# Patient Record
Sex: MALE | Race: ASIAN | Employment: FULL TIME | URBAN - METROPOLITAN AREA
[De-identification: names, ages, dates, MRNs, and addresses within clinical notes are randomized per-mention and may not be internally consistent; named-entity substitution may affect disease eponyms.]

---

## 2017-01-23 ENCOUNTER — APPOINTMENT (OUTPATIENT)
Dept: GENERAL RADIOLOGY | Age: 62
End: 2017-01-23
Attending: FAMILY MEDICINE
Payer: COMMERCIAL

## 2017-01-23 ENCOUNTER — HOSPITAL ENCOUNTER (OUTPATIENT)
Age: 62
Discharge: ANOTHER HOSPITAL | End: 2017-01-23
Attending: FAMILY MEDICINE
Payer: COMMERCIAL

## 2017-01-23 VITALS
SYSTOLIC BLOOD PRESSURE: 121 MMHG | RESPIRATION RATE: 24 BRPM | DIASTOLIC BLOOD PRESSURE: 79 MMHG | TEMPERATURE: 98 F | OXYGEN SATURATION: 94 % | HEART RATE: 81 BPM | WEIGHT: 175 LBS

## 2017-01-23 DIAGNOSIS — J18.9 COMMUNITY ACQUIRED PNEUMONIA: ICD-10-CM

## 2017-01-23 DIAGNOSIS — J90 PLEURAL EFFUSION, RIGHT: Primary | ICD-10-CM

## 2017-01-23 DIAGNOSIS — R09.02 HYPOXIA: ICD-10-CM

## 2017-01-23 PROCEDURE — 71101 X-RAY EXAM UNILAT RIBS/CHEST: CPT

## 2017-01-23 PROCEDURE — 99205 OFFICE O/P NEW HI 60 MIN: CPT

## 2017-01-23 RX ORDER — IBUPROFEN 800 MG/1
800 TABLET ORAL ONCE
Status: COMPLETED | OUTPATIENT
Start: 2017-01-23 | End: 2017-01-23

## 2017-01-23 NOTE — ED PROVIDER NOTES
Patient Seen in: [facility]    History   Patient presents with:  Pain (neurologic)    Stated Complaint: rib pain    HPI     Note: Patient is Spanish, and needs a [address] .     57-year-old male coming in with complaint 1232 95 %   O2 Device 01/23/17 1232 None (Room air)       Current:/79 mmHg  Pulse 81  Temp(Src) 98.3 °F (36.8 °C) (Oral)  Resp 24  Wt 79.379 kg  SpO2 94%    Physical Exam  GEN: Not in any acute distress, making good conversation, answering appropriat 1. No acute displaced right rib fracture. 2. Moderate size right pleural effusion with atelectasis/consolidation.     Dictated by: Andrew Rodriguez MD on 1/23/2017 at 13:57     Approved by: Andrew Rodriguez MD            No rib fractures noted on this x-ray and a moderat

## 2017-01-23 NOTE — ED INITIAL ASSESSMENT (HPI)
Patient states through  line that he was having chest pains at work. Patient states he heard a noise on the right side of his ribs and wasn't able to go to work on Friday. Patient states he was unable to tell his supervisor on Friday.   Patient

## 2020-09-15 ENCOUNTER — OFFICE VISIT (OUTPATIENT)
Dept: FAMILY MEDICINE CLINIC | Facility: CLINIC | Age: 65
End: 2020-09-15

## 2020-09-15 VITALS
WEIGHT: 200 LBS | RESPIRATION RATE: 17 BRPM | HEIGHT: 67.5 IN | HEART RATE: 86 BPM | BODY MASS INDEX: 31.02 KG/M2 | DIASTOLIC BLOOD PRESSURE: 118 MMHG | SYSTOLIC BLOOD PRESSURE: 172 MMHG | OXYGEN SATURATION: 99 %

## 2020-09-15 DIAGNOSIS — Z13.21 SCREENING FOR ENDOCRINE, NUTRITIONAL, METABOLIC AND IMMUNITY DISORDER: ICD-10-CM

## 2020-09-15 DIAGNOSIS — Z13.228 SCREENING FOR ENDOCRINE, NUTRITIONAL, METABOLIC AND IMMUNITY DISORDER: ICD-10-CM

## 2020-09-15 DIAGNOSIS — Z12.11 SCREENING FOR COLON CANCER: ICD-10-CM

## 2020-09-15 DIAGNOSIS — Z13.0 SCREENING FOR ENDOCRINE, NUTRITIONAL, METABOLIC AND IMMUNITY DISORDER: ICD-10-CM

## 2020-09-15 DIAGNOSIS — I15.2 HYPERTENSION ASSOCIATED WITH DIABETES (HCC): ICD-10-CM

## 2020-09-15 DIAGNOSIS — Z12.5 SCREENING FOR PROSTATE CANCER: ICD-10-CM

## 2020-09-15 DIAGNOSIS — E11.59 HYPERTENSION ASSOCIATED WITH DIABETES (HCC): ICD-10-CM

## 2020-09-15 DIAGNOSIS — E11.65 UNCONTROLLED TYPE 2 DIABETES MELLITUS WITH HYPERGLYCEMIA (HCC): Primary | ICD-10-CM

## 2020-09-15 DIAGNOSIS — Z13.29 SCREENING FOR ENDOCRINE, NUTRITIONAL, METABOLIC AND IMMUNITY DISORDER: ICD-10-CM

## 2020-09-15 LAB
CARTRIDGE LOT#: 638 NUMERIC
HEMOGLOBIN A1C: 11.5 % (ref 4.3–5.6)

## 2020-09-15 PROCEDURE — 3080F DIAST BP >= 90 MM HG: CPT | Performed by: EMERGENCY MEDICINE

## 2020-09-15 PROCEDURE — 3077F SYST BP >= 140 MM HG: CPT | Performed by: EMERGENCY MEDICINE

## 2020-09-15 PROCEDURE — 99204 OFFICE O/P NEW MOD 45 MIN: CPT | Performed by: EMERGENCY MEDICINE

## 2020-09-15 PROCEDURE — 3008F BODY MASS INDEX DOCD: CPT | Performed by: EMERGENCY MEDICINE

## 2020-09-15 PROCEDURE — 83036 HEMOGLOBIN GLYCOSYLATED A1C: CPT | Performed by: EMERGENCY MEDICINE

## 2020-09-15 RX ORDER — IRBESARTAN 150 MG/1
300 TABLET ORAL DAILY
COMMUNITY
Start: 2020-07-09 | End: 2020-09-15

## 2020-09-15 RX ORDER — AMLODIPINE BESYLATE 5 MG/1
5 TABLET ORAL DAILY
Qty: 30 TABLET | Refills: 2 | Status: SHIPPED | OUTPATIENT
Start: 2020-09-15 | End: 2021-02-07

## 2020-09-15 RX ORDER — IRBESARTAN 300 MG/1
300 TABLET ORAL NIGHTLY
Qty: 30 TABLET | Refills: 2 | Status: SHIPPED | OUTPATIENT
Start: 2020-09-15 | End: 2021-02-04

## 2020-09-15 NOTE — PROGRESS NOTES
Chief Complaint:   Patient presents with:  Physical: NP, annual physical     HPI:   This is a 72year old male     [address]  Dx with DM x 15 + years. Care mostly done in the [facility].  Last seen by MD was 1 1/2 years Weight as of this encounter: 200 lb (90.7 kg). Vital signs reviewed. Appears stated age, well groomed.   GENERAL: well developed, well nourished, well hydrated, no distress  SKIN: good skin turgor, no obvious rashes  HEENT: atraumatic, normocephalic, ears, PLATELET; Future  - COMP METABOLIC PANEL (14); Future  - LIPID PANEL; Future  - TSH W REFLEX TO FREE T4; Future    4. Screening for prostate cancer  - PSA SCREEN; Future    5. Screening for colon cancer  - OCCULT BLOOD, FECAL, IMMUNOASSAY;  Future        PA

## 2020-09-15 NOTE — PATIENT INSTRUCTIONS
Thank you for choosing [address] [place] [address] Group  To Do:  FOR INOCENTE BUSCH        1. Increase Metformin to 1000 mg twice a day  2. Continue with irbesartan 300 mg once a day (1 tab only)  3. Start amlodipine 5 mg once a day for blood pressure  4.  Start [employer] figure out what your ideal A1C or eAG should be. Your target number will depend on your age, general health, and other factors. If your current number is too high, your treatment plan may need changes, such as different medicines.   Sample results  Most peo

## 2020-12-23 ENCOUNTER — TELEPHONE (OUTPATIENT)
Dept: ENDOCRINOLOGY CLINIC | Facility: CLINIC | Age: 65
End: 2020-12-23

## 2020-12-23 NOTE — TELEPHONE ENCOUNTER
Spoke with patient regarding setting up an appointment with a diabetes provider for diabetes management at [facility] based on A1c from high A1c list. Patient states, \"I would like to be called next month on this after I see my doctor. \"

## 2021-01-01 ENCOUNTER — EXTERNAL RECORD (OUTPATIENT)
Dept: OTHER | Age: 66
End: 2021-01-01

## 2021-02-02 DIAGNOSIS — E11.59 HYPERTENSION ASSOCIATED WITH DIABETES (HCC): ICD-10-CM

## 2021-02-02 DIAGNOSIS — I15.2 HYPERTENSION ASSOCIATED WITH DIABETES (HCC): ICD-10-CM

## 2021-02-02 DIAGNOSIS — E11.65 UNCONTROLLED TYPE 2 DIABETES MELLITUS WITH HYPERGLYCEMIA (HCC): ICD-10-CM

## 2021-02-02 NOTE — TELEPHONE ENCOUNTER
Medication(s) to Refill:   Requested Prescriptions     Pending Prescriptions Disp Refills   • METFORMIN HCL 1000 MG Oral Tab [Pharmacy Med Name: metFORMIN HCl 1000 MG Oral Tablet] 60 tablet 0     Sig: TAKE 1 TABLET BY MOUTH TWICE DAILY WITH MEALS   • AMLOD

## 2021-02-04 ENCOUNTER — OFFICE VISIT (OUTPATIENT)
Dept: FAMILY MEDICINE CLINIC | Facility: CLINIC | Age: 66
End: 2021-02-04

## 2021-02-04 ENCOUNTER — APPOINTMENT (OUTPATIENT)
Dept: GENERAL RADIOLOGY | Facility: HOSPITAL | Age: 66
DRG: 228 | End: 2021-02-04
Attending: EMERGENCY MEDICINE
Payer: COMMERCIAL

## 2021-02-04 ENCOUNTER — HOSPITAL ENCOUNTER (INPATIENT)
Facility: HOSPITAL | Age: 66
LOS: 13 days | Discharge: HOME HEALTH | DRG: 228 | End: 2021-02-17
Attending: EMERGENCY MEDICINE | Admitting: HOSPITALIST
Payer: COMMERCIAL

## 2021-02-04 VITALS
OXYGEN SATURATION: 98 % | BODY MASS INDEX: 31.49 KG/M2 | RESPIRATION RATE: 16 BRPM | TEMPERATURE: 97 F | HEIGHT: 67.5 IN | DIASTOLIC BLOOD PRESSURE: 110 MMHG | WEIGHT: 203 LBS | SYSTOLIC BLOOD PRESSURE: 164 MMHG | HEART RATE: 132 BPM

## 2021-02-04 DIAGNOSIS — I48.91 ATRIAL FIBRILLATION WITH RVR (HCC): ICD-10-CM

## 2021-02-04 DIAGNOSIS — I15.2 HYPERTENSION ASSOCIATED WITH DIABETES (HCC): ICD-10-CM

## 2021-02-04 DIAGNOSIS — I48.91 ATRIAL FIBRILLATION WITH RVR (HCC): Primary | ICD-10-CM

## 2021-02-04 DIAGNOSIS — Z13.228 SCREENING FOR ENDOCRINE, NUTRITIONAL, METABOLIC AND IMMUNITY DISORDER: ICD-10-CM

## 2021-02-04 DIAGNOSIS — Z13.0 SCREENING FOR ENDOCRINE, NUTRITIONAL, METABOLIC AND IMMUNITY DISORDER: ICD-10-CM

## 2021-02-04 DIAGNOSIS — E11.59 HYPERTENSION ASSOCIATED WITH DIABETES (HCC): ICD-10-CM

## 2021-02-04 DIAGNOSIS — E11.65 UNCONTROLLED TYPE 2 DIABETES MELLITUS WITH HYPERGLYCEMIA (HCC): Primary | ICD-10-CM

## 2021-02-04 DIAGNOSIS — Z13.21 SCREENING FOR ENDOCRINE, NUTRITIONAL, METABOLIC AND IMMUNITY DISORDER: ICD-10-CM

## 2021-02-04 DIAGNOSIS — Z13.29 SCREENING FOR ENDOCRINE, NUTRITIONAL, METABOLIC AND IMMUNITY DISORDER: ICD-10-CM

## 2021-02-04 LAB
ALBUMIN SERPL-MCNC: 3.5 G/DL (ref 3.4–5)
ALBUMIN/GLOB SERPL: 0.8 {RATIO} (ref 1–2)
ALP LIVER SERPL-CCNC: 84 U/L
ALT SERPL-CCNC: 24 U/L
ANION GAP SERPL CALC-SCNC: 7 MMOL/L (ref 0–18)
APTT PPP: 119.1 SECONDS (ref 25.4–36.1)
APTT PPP: 31.7 SECONDS (ref 25.4–36.1)
AST SERPL-CCNC: 28 U/L (ref 15–37)
ATRIAL RATE: 156 BPM
BASOPHILS # BLD AUTO: 0.1 X10(3) UL (ref 0–0.2)
BASOPHILS NFR BLD AUTO: 1.1 %
BILIRUB SERPL-MCNC: 0.7 MG/DL (ref 0.1–2)
BUN BLD-MCNC: 22 MG/DL (ref 7–18)
BUN/CREAT SERPL: 16.7 (ref 10–20)
CALCIUM BLD-MCNC: 9.1 MG/DL (ref 8.5–10.1)
CHLORIDE SERPL-SCNC: 106 MMOL/L (ref 98–112)
CO2 SERPL-SCNC: 23 MMOL/L (ref 21–32)
CREAT BLD-MCNC: 1.32 MG/DL
DEPRECATED RDW RBC AUTO: 39.1 FL (ref 35.1–46.3)
EOSINOPHIL # BLD AUTO: 0.52 X10(3) UL (ref 0–0.7)
EOSINOPHIL NFR BLD AUTO: 5.7 %
ERYTHROCYTE [DISTWIDTH] IN BLOOD BY AUTOMATED COUNT: 12.7 % (ref 11–15)
EST. AVERAGE GLUCOSE BLD GHB EST-MCNC: 177 MG/DL (ref 68–126)
GLOBULIN PLAS-MCNC: 4.3 G/DL (ref 2.8–4.4)
GLUCOSE BLD-MCNC: 144 MG/DL (ref 70–99)
GLUCOSE BLD-MCNC: 148 MG/DL (ref 70–99)
GLUCOSE BLD-MCNC: 181 MG/DL (ref 70–99)
HBA1C MFR BLD HPLC: 7.8 % (ref ?–5.7)
HCT VFR BLD AUTO: 50.1 %
HGB BLD-MCNC: 17.2 G/DL
IMM GRANULOCYTES # BLD AUTO: 0.03 X10(3) UL (ref 0–1)
IMM GRANULOCYTES NFR BLD: 0.3 %
INR BLD: 0.92 (ref 0.89–1.11)
LYMPHOCYTES # BLD AUTO: 1.77 X10(3) UL (ref 1–4)
LYMPHOCYTES NFR BLD AUTO: 19.5 %
M PROTEIN MFR SERPL ELPH: 7.8 G/DL (ref 6.4–8.2)
MCH RBC QN AUTO: 29.1 PG (ref 26–34)
MCHC RBC AUTO-ENTMCNC: 34.3 G/DL (ref 31–37)
MCV RBC AUTO: 84.6 FL
MONOCYTES # BLD AUTO: 0.56 X10(3) UL (ref 0.1–1)
MONOCYTES NFR BLD AUTO: 6.2 %
NEUTROPHILS # BLD AUTO: 6.09 X10 (3) UL (ref 1.5–7.7)
NEUTROPHILS # BLD AUTO: 6.09 X10(3) UL (ref 1.5–7.7)
NEUTROPHILS NFR BLD AUTO: 67.2 %
OSMOLALITY SERPL CALC.SUM OF ELEC: 288 MOSM/KG (ref 275–295)
PLATELET # BLD AUTO: 214 10(3)UL (ref 150–450)
POTASSIUM SERPL-SCNC: 4.9 MMOL/L (ref 3.5–5.1)
PSA SERPL DL<=0.01 NG/ML-MCNC: 12.6 SECONDS (ref 12.4–14.6)
Q-T INTERVAL: 270 MS
QRS DURATION: 70 MS
QTC CALCULATION (BEZET): 418 MS
R AXIS: 14 DEGREES
RBC # BLD AUTO: 5.92 X10(6)UL
SARS-COV-2 RNA RESP QL NAA+PROBE: NOT DETECTED
SODIUM SERPL-SCNC: 136 MMOL/L (ref 136–145)
T AXIS: 86 DEGREES
T4 FREE SERPL-MCNC: 1 NG/DL (ref 0.8–1.7)
TROPONIN I SERPL-MCNC: <0.045 NG/ML (ref ?–0.04)
TSI SER-ACNC: 2.21 MIU/ML (ref 0.36–3.74)
VENTRICULAR RATE: 144 BPM
WBC # BLD AUTO: 9.1 X10(3) UL (ref 4–11)

## 2021-02-04 PROCEDURE — 3077F SYST BP >= 140 MM HG: CPT | Performed by: HOSPITALIST

## 2021-02-04 PROCEDURE — 93000 ELECTROCARDIOGRAM COMPLETE: CPT | Performed by: NURSE PRACTITIONER

## 2021-02-04 PROCEDURE — 3008F BODY MASS INDEX DOCD: CPT | Performed by: NURSE PRACTITIONER

## 2021-02-04 PROCEDURE — 99223 1ST HOSP IP/OBS HIGH 75: CPT | Performed by: HOSPITALIST

## 2021-02-04 PROCEDURE — 3077F SYST BP >= 140 MM HG: CPT | Performed by: NURSE PRACTITIONER

## 2021-02-04 PROCEDURE — 71045 X-RAY EXAM CHEST 1 VIEW: CPT | Performed by: EMERGENCY MEDICINE

## 2021-02-04 PROCEDURE — 99215 OFFICE O/P EST HI 40 MIN: CPT | Performed by: NURSE PRACTITIONER

## 2021-02-04 PROCEDURE — 3080F DIAST BP >= 90 MM HG: CPT | Performed by: HOSPITALIST

## 2021-02-04 PROCEDURE — 99255 IP/OBS CONSLTJ NEW/EST HI 80: CPT | Performed by: INTERNAL MEDICINE

## 2021-02-04 PROCEDURE — 3080F DIAST BP >= 90 MM HG: CPT | Performed by: NURSE PRACTITIONER

## 2021-02-04 RX ORDER — HEPARIN SODIUM AND DEXTROSE 10000; 5 [USP'U]/100ML; G/100ML
INJECTION INTRAVENOUS CONTINUOUS
Status: DISCONTINUED | OUTPATIENT
Start: 2021-02-04 | End: 2021-02-08

## 2021-02-04 RX ORDER — SODIUM CHLORIDE 9 MG/ML
INJECTION, SOLUTION INTRAVENOUS CONTINUOUS
Status: ACTIVE | OUTPATIENT
Start: 2021-02-04 | End: 2021-02-04

## 2021-02-04 RX ORDER — ACETAMINOPHEN 325 MG/1
650 TABLET ORAL EVERY 6 HOURS PRN
Status: DISCONTINUED | OUTPATIENT
Start: 2021-02-04 | End: 2021-02-10

## 2021-02-04 RX ORDER — DEXTROSE MONOHYDRATE 25 G/50ML
50 INJECTION, SOLUTION INTRAVENOUS
Status: DISCONTINUED | OUTPATIENT
Start: 2021-02-04 | End: 2021-02-10

## 2021-02-04 RX ORDER — ONDANSETRON 2 MG/ML
4 INJECTION INTRAMUSCULAR; INTRAVENOUS EVERY 4 HOURS PRN
Status: DISCONTINUED | OUTPATIENT
Start: 2021-02-04 | End: 2021-02-04

## 2021-02-04 RX ORDER — HEPARIN SODIUM 5000 [USP'U]/ML
5000 INJECTION INTRAVENOUS; SUBCUTANEOUS ONCE
Status: COMPLETED | OUTPATIENT
Start: 2021-02-04 | End: 2021-02-04

## 2021-02-04 RX ORDER — SODIUM CHLORIDE 9 MG/ML
1000 INJECTION, SOLUTION INTRAVENOUS ONCE
Status: COMPLETED | OUTPATIENT
Start: 2021-02-04 | End: 2021-02-04

## 2021-02-04 RX ORDER — DIGOXIN 0.25 MG/ML
250 INJECTION INTRAMUSCULAR; INTRAVENOUS ONCE
Status: COMPLETED | OUTPATIENT
Start: 2021-02-04 | End: 2021-02-04

## 2021-02-04 RX ORDER — LOSARTAN POTASSIUM 100 MG/1
100 TABLET ORAL DAILY
Status: DISCONTINUED | OUTPATIENT
Start: 2021-02-04 | End: 2021-02-04

## 2021-02-04 RX ORDER — HEPARIN SODIUM AND DEXTROSE 10000; 5 [USP'U]/100ML; G/100ML
1000 INJECTION INTRAVENOUS ONCE
Status: COMPLETED | OUTPATIENT
Start: 2021-02-04 | End: 2021-02-04

## 2021-02-04 RX ORDER — MELATONIN
3 NIGHTLY PRN
Status: DISCONTINUED | OUTPATIENT
Start: 2021-02-04 | End: 2021-02-10

## 2021-02-04 RX ORDER — SODIUM CHLORIDE 9 MG/ML
INJECTION, SOLUTION INTRAVENOUS CONTINUOUS
Status: DISCONTINUED | OUTPATIENT
Start: 2021-02-04 | End: 2021-02-05

## 2021-02-04 RX ORDER — LOSARTAN POTASSIUM 100 MG/1
100 TABLET ORAL DAILY
Status: DISCONTINUED | OUTPATIENT
Start: 2021-02-05 | End: 2021-02-05

## 2021-02-04 RX ORDER — ONDANSETRON 2 MG/ML
4 INJECTION INTRAMUSCULAR; INTRAVENOUS EVERY 6 HOURS PRN
Status: DISCONTINUED | OUTPATIENT
Start: 2021-02-04 | End: 2021-02-10

## 2021-02-04 RX ORDER — IRBESARTAN 300 MG/1
300 TABLET ORAL NIGHTLY
Qty: 90 TABLET | Refills: 0 | Status: SHIPPED | OUTPATIENT
Start: 2021-02-04 | End: 2021-05-05

## 2021-02-04 NOTE — ED PROVIDER NOTES
Patient Seen in: [facility] Emergency Department      History   Patient presents with:  Arrythmia/Palpitations    Stated Complaint: Sent over from PCP for A-fib w/ RVR.  Patient's daughter reports refused ambulan*    HPI/Subjective:   HPI    Patient Pulse 96   Temp 98.9 °F (37.2 °C) (Temporal)   Resp 23   Wt 90.7 kg   SpO2 99%   BMI 30.86 kg/m²         Physical Exam  GENERAL: Well-developed, well-nourished male sitting up breathing easily in no apparent distress. Patient is nontoxic in appearance.   H Normal   ASSAY, THYROID STIM HORMONE - Normal   RAPID SARS-COV-2 BY PCR - Normal   CBC WITH DIFFERENTIAL WITH PLATELET    Narrative: The following orders were created for panel order CBC WITH DIFFERENTIAL WITH PLATELET.   Procedure after this was initiated. Patient had no further new complaints throughout the rest of the emergency room stay and was feeling better. A Covid test was done which is pending at the time of this dictation.   Patient's case was discussed with Dr. Wilner dash

## 2021-02-04 NOTE — H&P
[facility]  History and Physical     Gregorio Sheppard Patient Status:  Inpatient    3/26/1955 MRN [id number]   [facility] 8NE-A Attending Oleg Mcpherson MD   Hosp Day # 0 PCP Anna Hodgson MD     Chief Complaint: afib    Hi lymphadenopathy. No JVD. No carotid bruits. Respiratory: Clear to auscultation bilaterally. No wheezes. No rhonchi. Cardiovascular: S1, S2. irregular rate and rhythm. No murmurs, rubs or gallops. Equal pulses.    Chest and Back: No tenderness or deformity

## 2021-02-04 NOTE — PROGRESS NOTES
HPI:   Sharyle Inch is a 72year old male who presents for follow up and medication refills. Reports his blood sugar at home average 120-140    Last visit with ophthalmologist was 2 years ago  Pt has been checking his feet on a regular basis.  Pt repo TMs pearly gray; not bulging or erythematous, throat without erythema or exudate. NECK: supple, no adenopathy, no carotid bruits. No thyromegaly. LUNGS: CTA b/l, no WRR.   CARDIO:Irregular heart rate , tachycardic ,  without murmur  GI: BS x4, normoact

## 2021-02-04 NOTE — CONSULTS
[facility] Cardiology  Report of Consultation    Venurao Stacys Patient Status:  Emergency    3/26/1955 MRN [id number]   Location [address] Attending Cecy Marie MD   [facility] Day # 0 PCP Juanito Crawford, [facility] at the right base. May be nothing. EKG: Rapid A. fib 144 bpm with no ischemia or pathological Q waves but minor nonspecific EKG changes. No previous EKG available. No prior cardiac history or testing.     History:  Past Medical History:   Diagnosis °F (37.2 °C), temperature source Temporal, resp. rate 23, weight 200 lb (90.7 kg), SpO2 99 %.   Temp (24hrs), Av °F (36.7 °C), Min:97.1 °F (36.2 °C), Max:98.9 °F (37.2 °C)    Wt Readings from Last 3 Encounters:  21 : 200 lb (90.7 kg)  21 : 2 Dustin Grajeda MD on 2/04/2021 at 1:20 PM         Labs:     Lab Results   Component Value Date    INR 0.92 02/04/2021        Lab Results   Component Value Date    WBC 9.1 02/04/2021    HGB 17.2 02/04/2021    HCT 50.1 02/04/2021    .0 02/04/2021    JASMINE ischemia on EKG and negative troponin but patient has risk factors for CAD - echo and rate control first  -Transition to NOAC later if affordable  -Check lipids, hemoglobin A1c and TSH panel    Discussed with the patient with the Nurse help (Maricarmen Nowak

## 2021-02-05 ENCOUNTER — APPOINTMENT (OUTPATIENT)
Dept: CV DIAGNOSTICS | Facility: HOSPITAL | Age: 66
DRG: 228 | End: 2021-02-05
Attending: HOSPITALIST
Payer: COMMERCIAL

## 2021-02-05 PROBLEM — I10 ESSENTIAL HYPERTENSION: Status: ACTIVE | Noted: 2020-09-15

## 2021-02-05 LAB
ANION GAP SERPL CALC-SCNC: 3 MMOL/L (ref 0–18)
APTT PPP: 64.3 SECONDS (ref 25.4–36.1)
BASOPHILS # BLD AUTO: 0.07 X10(3) UL (ref 0–0.2)
BASOPHILS NFR BLD AUTO: 1 %
BUN BLD-MCNC: 28 MG/DL (ref 7–18)
BUN/CREAT SERPL: 18.4 (ref 10–20)
CALCIUM BLD-MCNC: 8.4 MG/DL (ref 8.5–10.1)
CHLORIDE SERPL-SCNC: 108 MMOL/L (ref 98–112)
CHOLEST SMN-MCNC: 211 MG/DL (ref ?–200)
CO2 SERPL-SCNC: 28 MMOL/L (ref 21–32)
CREAT BLD-MCNC: 1.52 MG/DL
DEPRECATED RDW RBC AUTO: 40.2 FL (ref 35.1–46.3)
EOSINOPHIL # BLD AUTO: 0.77 X10(3) UL (ref 0–0.7)
EOSINOPHIL NFR BLD AUTO: 10.5 %
ERYTHROCYTE [DISTWIDTH] IN BLOOD BY AUTOMATED COUNT: 12.9 % (ref 11–15)
GLUCOSE BLD-MCNC: 118 MG/DL (ref 70–99)
GLUCOSE BLD-MCNC: 133 MG/DL (ref 70–99)
GLUCOSE BLD-MCNC: 137 MG/DL (ref 70–99)
GLUCOSE BLD-MCNC: 142 MG/DL (ref 70–99)
GLUCOSE BLD-MCNC: 156 MG/DL (ref 70–99)
HCT VFR BLD AUTO: 42.8 %
HDLC SERPL-MCNC: 32 MG/DL (ref 40–59)
HGB BLD-MCNC: 14.3 G/DL
IMM GRANULOCYTES # BLD AUTO: 0.03 X10(3) UL (ref 0–1)
IMM GRANULOCYTES NFR BLD: 0.4 %
LDLC SERPL CALC-MCNC: 149 MG/DL (ref ?–100)
LV EF: 42.5 %
LYMPHOCYTES # BLD AUTO: 2.01 X10(3) UL (ref 1–4)
LYMPHOCYTES NFR BLD AUTO: 27.5 %
MCH RBC QN AUTO: 29.1 PG (ref 26–34)
MCHC RBC AUTO-ENTMCNC: 33.4 G/DL (ref 31–37)
MCV RBC AUTO: 87.2 FL
MONOCYTES # BLD AUTO: 0.5 X10(3) UL (ref 0.1–1)
MONOCYTES NFR BLD AUTO: 6.8 %
NEUTROPHILS # BLD AUTO: 3.92 X10 (3) UL (ref 1.5–7.7)
NEUTROPHILS # BLD AUTO: 3.92 X10(3) UL (ref 1.5–7.7)
NEUTROPHILS NFR BLD AUTO: 53.8 %
NONHDLC SERPL-MCNC: 179 MG/DL (ref ?–130)
NT-PROBNP SERPL-MCNC: 1954 PG/ML (ref ?–125)
OSMOLALITY SERPL CALC.SUM OF ELEC: 295 MOSM/KG (ref 275–295)
PLATELET # BLD AUTO: 182 10(3)UL (ref 150–450)
POTASSIUM SERPL-SCNC: 4.7 MMOL/L (ref 3.5–5.1)
RBC # BLD AUTO: 4.91 X10(6)UL
SODIUM SERPL-SCNC: 139 MMOL/L (ref 136–145)
TRIGL SERPL-MCNC: 148 MG/DL (ref 30–149)
VLDLC SERPL CALC-MCNC: 30 MG/DL (ref 0–30)
WBC # BLD AUTO: 7.3 X10(3) UL (ref 4–11)

## 2021-02-05 PROCEDURE — 99233 SBSQ HOSP IP/OBS HIGH 50: CPT | Performed by: INTERNAL MEDICINE

## 2021-02-05 PROCEDURE — 93306 TTE W/DOPPLER COMPLETE: CPT | Performed by: HOSPITALIST

## 2021-02-05 PROCEDURE — 99233 SBSQ HOSP IP/OBS HIGH 50: CPT | Performed by: HOSPITALIST

## 2021-02-05 RX ORDER — ATORVASTATIN CALCIUM 10 MG/1
10 TABLET, FILM COATED ORAL NIGHTLY
Status: DISCONTINUED | OUTPATIENT
Start: 2021-02-05 | End: 2021-02-05

## 2021-02-05 RX ORDER — ATORVASTATIN CALCIUM 40 MG/1
40 TABLET, FILM COATED ORAL NIGHTLY
Status: DISCONTINUED | OUTPATIENT
Start: 2021-02-05 | End: 2021-02-17

## 2021-02-05 RX ORDER — SODIUM CHLORIDE 9 MG/ML
INJECTION, SOLUTION INTRAVENOUS
Status: ACTIVE | OUTPATIENT
Start: 2021-02-07 | End: 2021-02-07

## 2021-02-05 NOTE — PLAN OF CARE
Assumed care at 1900, pt resting in bed, A&Ox4. Primarily [address] speaking. O2 sat WNL on RA. Denies chest pain, A fib on tele. HR sustaining 40-60s. Heparin gtt infusing at 750units/7.5mL. Continent of bladder and bowel. Up with SBA.  Call light in reach a

## 2021-02-05 NOTE — CM/SW NOTE
Script for eliquis faxed to patient's [facility] (fax [phone])--will call shortly to check price  Phone  [phone] for eliquis $35.oo--[facility] open till 7pm tonight

## 2021-02-05 NOTE — PLAN OF CARE
Pt. Is alert and oriented times four. Lungs diminished on auscultation. Pt. Has no c/o pain at present. He is sinus rhythm on monitor. Heparin infusing at 7.5 ml/hr.

## 2021-02-05 NOTE — PLAN OF CARE
Discussed echo findings with patient via  service. Plan for angiogram Monday as outlined by Dr. Abad Rodney. Patient agreeable to this. Asked to call his spouse, I did, but she was unavailable at this time.  Will need covid testing Sunday as previous

## 2021-02-05 NOTE — PROGRESS NOTES
NURSING ADMISSION NOTE      Patient admitted via Cart, from. Pt is A&O X3, tagalog speaking can understand english not in apparent distress on RA, A-fib per tele, on cardizem and heparin drip  Oriented to room. Safety precautions initiated.   Bed in lo

## 2021-02-05 NOTE — PROGRESS NOTES
[facility]  Cardiology Progress Note    Brigette Vázquez Patient Status:  Inpatient    3/26/1955 MRN [id number]   [facility] 8NE-A Attending Wes Villanueva MD   Hosp Day # 1 PCP Phoebe Weiss MD       Subjective:  Up in chair, ju 02/04/2021    TSH 2.210 02/04/2021    TROP <0.045 02/04/2021       CXR:CONCLUSION:  Patchy right lower lobe consolidation      Medications:    • Insulin Aspart Pen  2-10 Units Subcutaneous TID CC and HS   • metoprolol tartrate  25 mg Oral 2x Daily(Beta Blo

## 2021-02-06 DIAGNOSIS — Z23 NEED FOR VACCINATION: ICD-10-CM

## 2021-02-06 LAB
ANION GAP SERPL CALC-SCNC: 4 MMOL/L (ref 0–18)
APTT PPP: 54.7 SECONDS (ref 25.4–36.1)
BUN BLD-MCNC: 24 MG/DL (ref 7–18)
BUN/CREAT SERPL: 19.2 (ref 10–20)
CALCIUM BLD-MCNC: 8.5 MG/DL (ref 8.5–10.1)
CHLORIDE SERPL-SCNC: 110 MMOL/L (ref 98–112)
CO2 SERPL-SCNC: 27 MMOL/L (ref 21–32)
CREAT BLD-MCNC: 1.25 MG/DL
CREAT UR-SCNC: 97.5 MG/DL
GLUCOSE BLD-MCNC: 126 MG/DL (ref 70–99)
GLUCOSE BLD-MCNC: 150 MG/DL (ref 70–99)
GLUCOSE BLD-MCNC: 152 MG/DL (ref 70–99)
GLUCOSE BLD-MCNC: 170 MG/DL (ref 70–99)
OSMOLALITY SERPL CALC.SUM OF ELEC: 299 MOSM/KG (ref 275–295)
OSMOLALITY UR: 432 MOSM/KG (ref 300–1300)
POTASSIUM SERPL-SCNC: 4.3 MMOL/L (ref 3.5–5.1)
SODIUM SERPL-SCNC: 141 MMOL/L (ref 136–145)
SODIUM SERPL-SCNC: 58 MMOL/L

## 2021-02-06 PROCEDURE — 99233 SBSQ HOSP IP/OBS HIGH 50: CPT | Performed by: HOSPITALIST

## 2021-02-06 PROCEDURE — 99233 SBSQ HOSP IP/OBS HIGH 50: CPT | Performed by: INTERNAL MEDICINE

## 2021-02-06 NOTE — PROGRESS NOTES
[facility]  Progress Note     Kurtis Paredes Patient Status:  Inpatient    3/26/1955 MRN [id number]   [facility] 8NE-A Attending Jazmine Moreira MD   Hosp Day # 1 PCP Diana Hernandez MD     Chief Complaint: afib    S: Patient H afib w/ RVR  1. cardizem gtt, started on PO meds  2. Hep gtt  3. Cards consult  4. ECHO EF 40-45%, hypokinesia fo anterior wall, septum, apex. Bicuspid AV, incr PA pressure  5. Hold dc. Plan for cath monday  2. HUSSAIN vs CKD  1.  Did not improve w/ IVF challen

## 2021-02-06 NOTE — PLAN OF CARE
Assumed care at [address] [place] [address], pt resting in bed. A&Ox4. Primarily speaks Tagalog. Denies pain and SOB. VSS. Heparin gtt infusing at 7.5mL/hr or 750units. Continent of bladder and bowel, up ad tommie. Call light in reach.    Plan: Cath on Monday 2/8      Problem: Diabe

## 2021-02-06 NOTE — PROGRESS NOTES
[facility]  Cardiology Progress Note    Danay Vieyra Patient Status:  Inpatient    3/26/1955 MRN [id number]   [facility] 8NE-A Attending Amada Pérez MD   Hosp Day # 2 PCP Mateus Li MD     Subjective:  Doing well.  No co Normal excursions and effort. Abdomen: Soft, non-tender. Extremities: Without clubbing, cyanosis or edema. Peripheral pulses are 2+. Skin: Warm and dry.      Medications:  • atorvastatin  40 mg Oral Nightly   • [START ON 2/7/2021] sodium chloride   Int

## 2021-02-06 NOTE — PROGRESS NOTES
[facility]  Progress Note     Joseph Catarino Patient Status:  Inpatient    3/26/1955 MRN [id number]   [facility] 8NE-A Attending Meaghan Lanza MD   Hosp Day # 2 PCP Yi Knowles MD     Chief Complaint: afib    S: Patient H tartrate  25 mg Oral 2x Daily(Beta Blocker)       ASSESSMENT / PLAN:     1. New onset afib w/ RVR  1. cardizem gtt, started on PO meds  2. Hep gtt, DOAC at dc  3. Cards consult  4. ECHO EF 40-45%, hypokinesia fo anterior wall, septum, apex.  Bicuspid AV, in

## 2021-02-07 ENCOUNTER — APPOINTMENT (OUTPATIENT)
Dept: ULTRASOUND IMAGING | Facility: HOSPITAL | Age: 66
DRG: 228 | End: 2021-02-07
Attending: HOSPITALIST
Payer: COMMERCIAL

## 2021-02-07 LAB
ANION GAP SERPL CALC-SCNC: 4 MMOL/L (ref 0–18)
APTT PPP: 51.5 SECONDS (ref 25.4–36.1)
BUN BLD-MCNC: 26 MG/DL (ref 7–18)
BUN/CREAT SERPL: 18.2 (ref 10–20)
CALCIUM BLD-MCNC: 8.4 MG/DL (ref 8.5–10.1)
CHLORIDE SERPL-SCNC: 106 MMOL/L (ref 98–112)
CO2 SERPL-SCNC: 27 MMOL/L (ref 21–32)
CREAT BLD-MCNC: 1.43 MG/DL
GLUCOSE BLD-MCNC: 137 MG/DL (ref 70–99)
GLUCOSE BLD-MCNC: 144 MG/DL (ref 70–99)
GLUCOSE BLD-MCNC: 149 MG/DL (ref 70–99)
GLUCOSE BLD-MCNC: 152 MG/DL (ref 70–99)
GLUCOSE BLD-MCNC: 180 MG/DL (ref 70–99)
GLUCOSE BLD-MCNC: 94 MG/DL (ref 70–99)
OSMOLALITY SERPL CALC.SUM OF ELEC: 292 MOSM/KG (ref 275–295)
POTASSIUM SERPL-SCNC: 4.1 MMOL/L (ref 3.5–5.1)
SARS-COV-2 RNA RESP QL NAA+PROBE: NOT DETECTED
SODIUM SERPL-SCNC: 137 MMOL/L (ref 136–145)

## 2021-02-07 PROCEDURE — 99232 SBSQ HOSP IP/OBS MODERATE 35: CPT | Performed by: HOSPITALIST

## 2021-02-07 PROCEDURE — 99232 SBSQ HOSP IP/OBS MODERATE 35: CPT | Performed by: INTERNAL MEDICINE

## 2021-02-07 PROCEDURE — 76770 US EXAM ABDO BACK WALL COMP: CPT | Performed by: HOSPITALIST

## 2021-02-07 RX ORDER — AMLODIPINE BESYLATE 5 MG/1
5 TABLET ORAL DAILY
Qty: 30 TABLET | Refills: 0 | Status: SHIPPED | OUTPATIENT
Start: 2021-02-07 | End: 2021-02-17

## 2021-02-07 RX ORDER — IRBESARTAN 300 MG/1
TABLET ORAL
Qty: 30 TABLET | Refills: 0 | OUTPATIENT
Start: 2021-02-07

## 2021-02-07 RX ORDER — HYDRALAZINE HYDROCHLORIDE 20 MG/ML
10 INJECTION INTRAMUSCULAR; INTRAVENOUS EVERY 6 HOURS PRN
Status: DISCONTINUED | OUTPATIENT
Start: 2021-02-07 | End: 2021-02-10

## 2021-02-07 RX ORDER — HYDRALAZINE HYDROCHLORIDE 20 MG/ML
10 INJECTION INTRAMUSCULAR; INTRAVENOUS ONCE
Status: COMPLETED | OUTPATIENT
Start: 2021-02-07 | End: 2021-02-07

## 2021-02-07 NOTE — PLAN OF CARE
Assumed care of patient at 0730. Denies any chest pain or shortness of breath. Afib, heart rates controlled. Heparin gtt infusing at 750 units/hr. PTT therapeutic this morning (ACS protocol) at 51.5. Plan for angiogram tomorrow.   Will continue to [facility] blood pressure (other measures as available)  - Encourage oral intake as appropriate  - Instruct patient on fluid and nutrition restrictions as appropriate  Outcome: Progressing

## 2021-02-07 NOTE — PLAN OF CARE
Alert. Oriented. Afib per tele. Hr 50s. Asymptomatic. A little nervous about LHC on Monday. Denies cp, sob. Up ad tommie. No edema noted. On Hep gtt/ acs protocol. Poc discussed with pt. Voiced awarenss & understanding.  Will swab pt this morning for rpt rapid

## 2021-02-07 NOTE — PROGRESS NOTES
[facility]  Cardiology Progress Note    Venurao Stacys Patient Status:  Inpatient    3/26/1955 MRN [id number]   [facility] 8NE-A Attending Chen Bentley MD   Hosp Day # 3 PCP Juanito Crawford MD     Subjective:  No complaints of Continue BB, statin  2. Continue IV heparin  3. LHC in am. BMP in am.     JORGE A Mccollum  2/7/2021  10:03 AM    Patient seen and examined    Patient voices no other complaints other than boredom.     Lungs: Clear, CV: Regular rate rhythm, abdomen: Sof

## 2021-02-07 NOTE — PROGRESS NOTES
[facility]  Progress Note     Nolberto Avers Patient Status:  Inpatient    3/26/1955 MRN [id number]   [facility] 8NE-A Attending Joe Rose MD   Hosp Day # 3 PCP Sri Rojas MD     Chief Complaint: afib    S: Patient H Oral Nightly   • sodium chloride   Intravenous On Call   • Insulin Aspart Pen  2-10 Units Subcutaneous TID CC and HS       ASSESSMENT / PLAN:     1. New onset afib w/ RVR  1. cardizem gtt, started on PO meds  2. Hep gtt, DOAC at dc  3. Cards consult  4.  EC

## 2021-02-08 ENCOUNTER — APPOINTMENT (OUTPATIENT)
Dept: INTERVENTIONAL RADIOLOGY/VASCULAR | Facility: HOSPITAL | Age: 66
DRG: 228 | End: 2021-02-08
Attending: NURSE PRACTITIONER
Payer: COMMERCIAL

## 2021-02-08 LAB
ANION GAP SERPL CALC-SCNC: 4 MMOL/L (ref 0–18)
APTT PPP: 51.2 SECONDS (ref 25.4–36.1)
BUN BLD-MCNC: 25 MG/DL (ref 7–18)
BUN/CREAT SERPL: 17 (ref 10–20)
CALCIUM BLD-MCNC: 9.1 MG/DL (ref 8.5–10.1)
CHLORIDE SERPL-SCNC: 110 MMOL/L (ref 98–112)
CO2 SERPL-SCNC: 25 MMOL/L (ref 21–32)
CREAT BLD-MCNC: 1.47 MG/DL
GLUCOSE BLD-MCNC: 117 MG/DL (ref 70–99)
GLUCOSE BLD-MCNC: 131 MG/DL (ref 70–99)
GLUCOSE BLD-MCNC: 135 MG/DL (ref 70–99)
GLUCOSE BLD-MCNC: 139 MG/DL (ref 70–99)
GLUCOSE BLD-MCNC: 150 MG/DL (ref 70–99)
OSMOLALITY SERPL CALC.SUM OF ELEC: 294 MOSM/KG (ref 275–295)
POTASSIUM SERPL-SCNC: 4.1 MMOL/L (ref 3.5–5.1)
SODIUM SERPL-SCNC: 139 MMOL/L (ref 136–145)

## 2021-02-08 PROCEDURE — B211YZZ FLUOROSCOPY OF MULTIPLE CORONARY ARTERIES USING OTHER CONTRAST: ICD-10-PCS | Performed by: INTERNAL MEDICINE

## 2021-02-08 PROCEDURE — B312YZZ FLUOROSCOPY OF LEFT SUBCLAVIAN ARTERY USING OTHER CONTRAST: ICD-10-PCS | Performed by: INTERNAL MEDICINE

## 2021-02-08 PROCEDURE — 99232 SBSQ HOSP IP/OBS MODERATE 35: CPT | Performed by: HOSPITALIST

## 2021-02-08 PROCEDURE — 93458 L HRT ARTERY/VENTRICLE ANGIO: CPT | Performed by: INTERNAL MEDICINE

## 2021-02-08 PROCEDURE — B215YZZ FLUOROSCOPY OF LEFT HEART USING OTHER CONTRAST: ICD-10-PCS | Performed by: INTERNAL MEDICINE

## 2021-02-08 PROCEDURE — 99152 MOD SED SAME PHYS/QHP 5/>YRS: CPT | Performed by: INTERNAL MEDICINE

## 2021-02-08 PROCEDURE — 4A023N7 MEASUREMENT OF CARDIAC SAMPLING AND PRESSURE, LEFT HEART, PERCUTANEOUS APPROACH: ICD-10-PCS | Performed by: INTERNAL MEDICINE

## 2021-02-08 RX ORDER — SODIUM CHLORIDE 9 MG/ML
INJECTION, SOLUTION INTRAVENOUS
Status: COMPLETED | OUTPATIENT
Start: 2021-02-08 | End: 2021-02-08

## 2021-02-08 RX ORDER — ASPIRIN 325 MG
325 TABLET, DELAYED RELEASE (ENTERIC COATED) ORAL DAILY
Status: DISCONTINUED | OUTPATIENT
Start: 2021-02-09 | End: 2021-02-14

## 2021-02-08 RX ORDER — SODIUM CHLORIDE 9 MG/ML
INJECTION, SOLUTION INTRAVENOUS CONTINUOUS
Status: ACTIVE | OUTPATIENT
Start: 2021-02-08 | End: 2021-02-08

## 2021-02-08 RX ORDER — ASPIRIN 81 MG/1
TABLET, CHEWABLE ORAL
Status: COMPLETED
Start: 2021-02-08 | End: 2021-02-08

## 2021-02-08 RX ORDER — ISOSORBIDE DINITRATE 10 MG/1
10 TABLET ORAL
Status: DISCONTINUED | OUTPATIENT
Start: 2021-02-08 | End: 2021-02-10

## 2021-02-08 RX ORDER — ACETAMINOPHEN AND CODEINE PHOSPHATE 300; 30 MG/1; MG/1
2 TABLET ORAL EVERY 4 HOURS PRN
Status: DISCONTINUED | OUTPATIENT
Start: 2021-02-08 | End: 2021-02-10

## 2021-02-08 RX ORDER — HEPARIN SODIUM 5000 [USP'U]/ML
INJECTION, SOLUTION INTRAVENOUS; SUBCUTANEOUS
Status: COMPLETED
Start: 2021-02-08 | End: 2021-02-08

## 2021-02-08 RX ORDER — HEPARIN SODIUM AND DEXTROSE 10000; 5 [USP'U]/100ML; G/100ML
INJECTION INTRAVENOUS CONTINUOUS
Status: DISCONTINUED | OUTPATIENT
Start: 2021-02-08 | End: 2021-02-10

## 2021-02-08 RX ORDER — HYDRALAZINE HYDROCHLORIDE 25 MG/1
25 TABLET, FILM COATED ORAL EVERY 8 HOURS SCHEDULED
Status: DISCONTINUED | OUTPATIENT
Start: 2021-02-08 | End: 2021-02-10

## 2021-02-08 RX ORDER — LIDOCAINE HYDROCHLORIDE 10 MG/ML
INJECTION, SOLUTION EPIDURAL; INFILTRATION; INTRACAUDAL; PERINEURAL
Status: COMPLETED
Start: 2021-02-08 | End: 2021-02-08

## 2021-02-08 RX ORDER — MIDAZOLAM HYDROCHLORIDE 1 MG/ML
INJECTION INTRAMUSCULAR; INTRAVENOUS
Status: COMPLETED
Start: 2021-02-08 | End: 2021-02-08

## 2021-02-08 RX ORDER — ACETAMINOPHEN AND CODEINE PHOSPHATE 300; 30 MG/1; MG/1
1 TABLET ORAL EVERY 4 HOURS PRN
Status: DISCONTINUED | OUTPATIENT
Start: 2021-02-08 | End: 2021-02-10

## 2021-02-08 RX ORDER — ACETAMINOPHEN 325 MG/1
650 TABLET ORAL EVERY 4 HOURS PRN
Status: DISCONTINUED | OUTPATIENT
Start: 2021-02-08 | End: 2021-02-10

## 2021-02-08 RX ORDER — SODIUM CHLORIDE 9 MG/ML
INJECTION, SOLUTION INTRAVENOUS
Status: DISCONTINUED | OUTPATIENT
Start: 2021-02-09 | End: 2021-02-08

## 2021-02-08 NOTE — PLAN OF CARE
Pt back from cath lab at 1500. Right groin site CDI, no drainage or hematoma noted. Dr Jordyn Rizzo in to see pt. Wife and daughter in law on phone. Surgical consult called for Dr Edmond Nguyen. BP elevated. Hydralazine and Imdur added.  Pt instructed on post cath activit

## 2021-02-08 NOTE — PROGRESS NOTES
[facility]  Progress Note     Josephsugar Toribio Patient Status:  Inpatient    3/26/1955 MRN [id number]   [facility] 8NE-A Attending Meaghan Lanza MD   Hosp Day # 4 PCP Yi Knowles MD     Chief Complaint: afib    S: Patient H Imaging: Imaging data reviewed in Epic.     Medications:   • metoprolol tartrate  25 mg Oral TID Beta Blocker/Cardiac   • atorvastatin  40 mg Oral Nightly   • Insulin Aspart Pen  2-10 Units Subcutaneous TID CC and HS       ASSESSMENT / PLAN:

## 2021-02-08 NOTE — PROGRESS NOTES
Full procedure was dictated.     Procedure performed:   - LHC  - LV-gram  - Left subclavian artery angiography with subselective shot of left TANVIR to define anatomy in preparation for open heart surgery    Pre and postoperative diagnosis:  -New rapid atrial kidney especially now perioperatively  -Statin  -May need gated CTA of thoracic aorta to define the size of thoracic aorta aneurysm prior to CABG -we will schedule in 24 to 48 hours but patient has renal insufficiency -IV hydration after this procedure.   H

## 2021-02-08 NOTE — PLAN OF CARE
Assumed care of pt at 2300. Alert and oriented x4. On RA. Denies any SOB or chest pain. A fib on tele, rates controlled. Heparin gtt infusing per ACS protocol. Continent to bowel and bladder. Denies pain. Up with SBA.  Plan for angiogram. Call light within

## 2021-02-08 NOTE — PLAN OF CARE
Assumed care of patient at 0730. Alert and oriented x4. Tele rhythm controlled afib. On room air. Breath sounds clear bilaterally. Bed locked and in low position. Call light and personal items within reach.  No c/o chest pain, sob, or n/v. Pt continent of b assess for edema, trend weights  Outcome: Progressing  Goal: Absence of cardiac arrhythmias or at baseline  Description: INTERVENTIONS:  - Continuous cardiac monitoring, monitor vital signs, obtain 12 lead EKG if indicated  - Evaluate effectiveness of anti replacements, including rhythm and repeat lab results as appropriate  - Fluid restriction as ordered  - Instruct patient on fluid and nutrition restrictions as appropriate  Outcome: Progressing  Goal: Hemodynamic stability and optimal renal function mainta

## 2021-02-08 NOTE — CM/SW NOTE
Care Progression Note:  Active Acute Medical Issue:   Atrial fibrillation with RVR ([address]     Other Contributing Medical Factors/Dx. :     Length of stay: 4  GMLOS:  Avoidable Delays:   Discharge Barriers: left heart catherization today--CV on consult regard

## 2021-02-09 ENCOUNTER — APPOINTMENT (OUTPATIENT)
Dept: CT IMAGING | Facility: HOSPITAL | Age: 66
DRG: 228 | End: 2021-02-09
Attending: CLINICAL NURSE SPECIALIST
Payer: COMMERCIAL

## 2021-02-09 ENCOUNTER — ANESTHESIA EVENT (OUTPATIENT)
Dept: CARDIAC SURGERY | Facility: HOSPITAL | Age: 66
DRG: 228 | End: 2021-02-09
Payer: COMMERCIAL

## 2021-02-09 LAB
ANION GAP SERPL CALC-SCNC: 3 MMOL/L (ref 0–18)
ANTIBODY SCREEN: NEGATIVE
APTT PPP: 45.2 SECONDS (ref 25.4–36.1)
APTT PPP: 53.2 SECONDS (ref 25.4–36.1)
BILIRUB UR QL STRIP.AUTO: NEGATIVE
BUN BLD-MCNC: 29 MG/DL (ref 7–18)
BUN/CREAT SERPL: 16.4 (ref 10–20)
CALCIUM BLD-MCNC: 9.3 MG/DL (ref 8.5–10.1)
CHLORIDE SERPL-SCNC: 106 MMOL/L (ref 98–112)
CLARITY UR REFRACT.AUTO: CLEAR
CO2 SERPL-SCNC: 27 MMOL/L (ref 21–32)
COLOR UR AUTO: YELLOW
CREAT BLD-MCNC: 1.77 MG/DL
DEPRECATED RDW RBC AUTO: 41.1 FL (ref 35.1–46.3)
ERYTHROCYTE [DISTWIDTH] IN BLOOD BY AUTOMATED COUNT: 13 % (ref 11–15)
GLUCOSE BLD-MCNC: 112 MG/DL (ref 70–99)
GLUCOSE BLD-MCNC: 126 MG/DL (ref 70–99)
GLUCOSE BLD-MCNC: 132 MG/DL (ref 70–99)
GLUCOSE BLD-MCNC: 161 MG/DL (ref 70–99)
GLUCOSE UR STRIP.AUTO-MCNC: NEGATIVE MG/DL
HCT VFR BLD AUTO: 45 %
HGB BLD-MCNC: 14.9 G/DL
KETONES UR STRIP.AUTO-MCNC: NEGATIVE MG/DL
LEUKOCYTE ESTERASE UR QL STRIP.AUTO: NEGATIVE
MCH RBC QN AUTO: 29.3 PG (ref 26–34)
MCHC RBC AUTO-ENTMCNC: 33.1 G/DL (ref 31–37)
MCV RBC AUTO: 88.4 FL
NITRITE UR QL STRIP.AUTO: NEGATIVE
OSMOLALITY SERPL CALC.SUM OF ELEC: 291 MOSM/KG (ref 275–295)
PH UR STRIP.AUTO: 5 [PH] (ref 4.5–8)
PLATELET # BLD AUTO: 191 10(3)UL (ref 150–450)
POTASSIUM SERPL-SCNC: 3.9 MMOL/L (ref 3.5–5.1)
PROT UR STRIP.AUTO-MCNC: 100 MG/DL
RBC # BLD AUTO: 5.09 X10(6)UL
RBC UR QL AUTO: NEGATIVE
RH BLOOD TYPE: POSITIVE
SODIUM SERPL-SCNC: 136 MMOL/L (ref 136–145)
SP GR UR STRIP.AUTO: 1.05 (ref 1–1.03)
UROBILINOGEN UR STRIP.AUTO-MCNC: <2 MG/DL
WBC # BLD AUTO: 8.8 X10(3) UL (ref 4–11)

## 2021-02-09 PROCEDURE — 71275 CT ANGIOGRAPHY CHEST: CPT | Performed by: INTERNAL MEDICINE

## 2021-02-09 PROCEDURE — 71275 CT ANGIOGRAPHY CHEST: CPT | Performed by: CLINICAL NURSE SPECIALIST

## 2021-02-09 PROCEDURE — 99233 SBSQ HOSP IP/OBS HIGH 50: CPT | Performed by: NURSE PRACTITIONER

## 2021-02-09 PROCEDURE — 99232 SBSQ HOSP IP/OBS MODERATE 35: CPT | Performed by: HOSPITALIST

## 2021-02-09 RX ORDER — SODIUM CHLORIDE 9 MG/ML
INJECTION, SOLUTION INTRAVENOUS
Status: COMPLETED | OUTPATIENT
Start: 2021-02-10 | End: 2021-02-09

## 2021-02-09 NOTE — PROCEDURES
[address]    PATIENT'S NAME: Ghanshyam Mcnamara   ATTENDING PHYSICIAN: Jin Vasques MD   OPERATING PHYSICIAN: Marquis Patti M.D.    PATIENT ACCOUNT#:   [de-identified]    LOCATION:  [address] [place] [address]  MEDICAL RECORD #:   [id number]       DATE OF BI catheter. We measured pullback pressures across the aortic valve. The pigtail catheter was removed. Right femoral arterial sheathogram was performed and was acceptable for percutaneous closure using 6-Spanish Angio-Seal closure device.   There was no bl disease in proximal segment and no significant disease in small right posterolateral branches. There were collaterals from distal RCA to LAD artery filling the LAD in a retrograde fashion up to proximal lesion.     Left subclavian artery angiography reveal lab today. 4.   Continue beta blocker schedule and we will increase the dose and change to long-acting beta blocker later during hospitalization for reduced EF.   5.   Add hydralazine and Isordil for new LV dysfunction with renal insufficiency and avoid me

## 2021-02-09 NOTE — PROGRESS NOTES
[facility]  Progress Note     Kurtis Paredes Patient Status:  Inpatient    3/26/1955 MRN [id number]   [facility] 8NE-A Attending Jazmine Moreira MD   Hosp Day # 5 PCP Diana Hernandez MD     Chief Complaint: afib    S: Patient H Recent Labs   Lab 02/04/21  1231   TROP <0.045            Imaging: Imaging data reviewed in Epic.     Medications:   • Insulin Aspart Pen  2-10 Units Subcutaneous TID CC and HS   • [START ON 2/10/2021] sodium chloride   Intravenous On Call   • mupiroc

## 2021-02-09 NOTE — PLAN OF CARE
S/p LHC yesterday. Rt groin soft, no hematoma, no bleeding noted. Alert. Oriented. Afib per tele. Hr 70s. Denies pain. Voided. Hep gtt infusing. Poc discussed with pt. Cont. to monitor pt.

## 2021-02-09 NOTE — PROGRESS NOTES
[facility]  Cardiology Progress Note    Gregorio Sheppard Patient Status:  Inpatient    3/26/1955 MRN [id number]   [facility] 8NE-A Attending Jaqueline Pierre MD   Hosp Day # 5 PCP Anna Hodgson MD       Subjective:  Up in room.  Jus Blocker/Cardiac   • atorvastatin  40 mg Oral Nightly     • Continuous dose Heparin infusion 900 Units/hr (02/09/21 0900)     [facility] 2/8/21  IMPRESSION:    1.     Multivessel coronary artery disease with normal left main coronary artery angiographically.   2. Frankey Manus, APRN  2/9/2021  10:50 AM

## 2021-02-09 NOTE — PLAN OF CARE
Received patient at 0730. Alert and Oriented x4. Tele Rhythm Afib (rate controled) O2 saturation 96% On room air. Breath sounds clear. Bed is locked and in low position. Call light and personal items within reach. No C/O chest pain or shortness of breath. arterial and/or venous puncture sites for bleeding and/or hematoma  - Assess quality of pulses, skin color and temperature  - Assess for signs of decreased coronary artery perfusion - ex.  Angina  - Evaluate fluid balance, assess for edema, trend weights  O normal limits  Description: INTERVENTIONS:  - Monitor labs and rhythm and assess patient for signs and symptoms of electrolyte imbalances  - Administer electrolyte replacement as ordered  - Monitor response to electrolyte replacements, including rhythm and

## 2021-02-09 NOTE — IMAGING NOTE
Patient arrived in room 2 CT scan via hospital bed. Patient alert and coherent, denies CP distress. Ambulatory from bed to CT scan table. Patient tolerated procedure well. Average heart rate = 80   .   Denies allergic reaction to IV contrast.  Assisted ba

## 2021-02-09 NOTE — CONSULTS
Tang 89 Patient Status:  Inpatient    3/26/1955 MRN [id number]   [facility] 8NE-A Attending Em Harrell MD   [address] Day # 5 PCP Butch Cruz MD     Date of Admission:  2021    [place] [address]    Phone: [phone]   · amLODIPine Besylate 5 MG Tabs     You can get these medications from any pharmacy    Bring a paper prescription for each of these medications  · apixaban 5 MG Tabs         Social History:   reports that he has CAD.  He presented with chest pain and new onset a-fib. The cardiac cath confirms the diffuse disease. Recommendations:  Mr. Rosetta Kenyon has extensive CAD.   This is diffuse and will be difficult to obtain full revascularization however PCI would require t

## 2021-02-10 ENCOUNTER — APPOINTMENT (OUTPATIENT)
Dept: GENERAL RADIOLOGY | Facility: HOSPITAL | Age: 66
DRG: 228 | End: 2021-02-10
Attending: THORACIC SURGERY (CARDIOTHORACIC VASCULAR SURGERY)
Payer: COMMERCIAL

## 2021-02-10 ENCOUNTER — ANESTHESIA (OUTPATIENT)
Dept: CARDIAC SURGERY | Facility: HOSPITAL | Age: 66
DRG: 228 | End: 2021-02-10
Payer: COMMERCIAL

## 2021-02-10 LAB
ANION GAP SERPL CALC-SCNC: 7 MMOL/L (ref 0–18)
APTT PPP: 40.5 SECONDS (ref 25.4–36.1)
APTT PPP: 42.1 SECONDS (ref 25.4–36.1)
APTT PPP: 63.2 SECONDS (ref 25.4–36.1)
ARTERIAL BLD GAS O2 SATURATION: 97 % (ref 92–100)
ARTERIAL BLOOD GAS BASE EXCESS: -3.3 MMOL/L (ref ?–2)
ARTERIAL BLOOD GAS HCO3: 22.3 MEQ/L (ref 22–26)
ARTERIAL BLOOD GAS PCO2: 41 MM HG (ref 35–45)
ARTERIAL BLOOD GAS PH: 7.34 (ref 7.35–7.45)
ARTERIAL BLOOD GAS PO2: 159 MM HG (ref 80–105)
BUN BLD-MCNC: 30 MG/DL (ref 7–18)
BUN BLD-MCNC: 30 MG/DL (ref 7–18)
BUN/CREAT SERPL: 17.6 (ref 10–20)
CALCIUM BLD-MCNC: 7.6 MG/DL (ref 8.5–10.1)
CALCIUM BLD-MCNC: 8.8 MG/DL (ref 8.5–10.1)
CALCULATED O2 SATURATION: 99 % (ref 92–100)
CARBOXYHEMOGLOBIN: 1.5 % SAT (ref 0–3)
CHLORIDE SERPL-SCNC: 108 MMOL/L (ref 98–112)
CHLORIDE SERPL-SCNC: 109 MMOL/L (ref 98–112)
CO2 SERPL-SCNC: 23 MMOL/L (ref 21–32)
CO2 SERPL-SCNC: 24 MMOL/L (ref 21–32)
CREAT BLD-MCNC: 1.7 MG/DL
CREAT BLD-MCNC: 1.93 MG/DL
DEPRECATED RDW RBC AUTO: 39.5 FL (ref 35.1–46.3)
ERYTHROCYTE [DISTWIDTH] IN BLOOD BY AUTOMATED COUNT: 13 % (ref 11–15)
FIBRINOGEN: 332 MG/DL (ref 200–446)
FIBRINOGEN: 341 MG/DL (ref 200–446)
FIO2: 50 %
GLUCOSE BLD-MCNC: 126 MG/DL (ref 70–99)
GLUCOSE BLD-MCNC: 126 MG/DL (ref 70–99)
GLUCOSE BLD-MCNC: 129 MG/DL (ref 70–99)
GLUCOSE BLD-MCNC: 132 MG/DL (ref 70–99)
GLUCOSE BLD-MCNC: 133 MG/DL (ref 70–99)
GLUCOSE BLD-MCNC: 137 MG/DL (ref 70–99)
GLUCOSE BLD-MCNC: 142 MG/DL (ref 70–99)
GLUCOSE BLD-MCNC: 143 MG/DL (ref 70–99)
GLUCOSE BLD-MCNC: 144 MG/DL (ref 70–99)
GLUCOSE BLD-MCNC: 144 MG/DL (ref 70–99)
GLUCOSE BLD-MCNC: 150 MG/DL (ref 70–99)
GLUCOSE BLD-MCNC: 159 MG/DL (ref 70–99)
GLUCOSE BLD-MCNC: 159 MG/DL (ref 70–99)
GLUCOSE BLD-MCNC: 160 MG/DL (ref 70–99)
GLUCOSE BLD-MCNC: 166 MG/DL (ref 70–99)
HAV IGM SER QL: 2.6 MG/DL (ref 1.6–2.6)
HCT VFR BLD AUTO: 38.3 %
HGB BLD-MCNC: 13.4 G/DL
INR BLD: 0.96 (ref 0.89–1.11)
INR BLD: 1.2 (ref 0.89–1.11)
INR BLD: 1.29 (ref 0.89–1.11)
IONIZED CALCIUM: 1.11 MMOL/L (ref 1.12–1.32)
ISTAT ACTIVATED CLOTTING TIME: 142 SECONDS (ref 74–137)
ISTAT ACTIVATED CLOTTING TIME: 142 SECONDS (ref 74–137)
ISTAT ACTIVATED CLOTTING TIME: 153 SECONDS (ref 74–137)
ISTAT ACTIVATED CLOTTING TIME: 544 SECONDS (ref 74–137)
ISTAT ACTIVATED CLOTTING TIME: 582 SECONDS (ref 74–137)
ISTAT ACTIVATED CLOTTING TIME: 681 SECONDS (ref 74–137)
ISTAT BLOOD GAS BASE EXCESS: -1 MMOL/L (ref ?–30)
ISTAT BLOOD GAS BASE EXCESS: -4 MMOL/L (ref ?–30)
ISTAT BLOOD GAS BASE EXCESS: -5 MMOL/L (ref ?–30)
ISTAT BLOOD GAS BASE EXCESS: 1 MMOL/L (ref ?–30)
ISTAT BLOOD GAS BASE EXCESS: 2 MMOL/L (ref ?–30)
ISTAT BLOOD GAS HCO3: 20.7 MEQ/L (ref 22–26)
ISTAT BLOOD GAS HCO3: 22.6 MEQ/L (ref 22–26)
ISTAT BLOOD GAS HCO3: 24.6 MEQ/L (ref 22–26)
ISTAT BLOOD GAS HCO3: 26.3 MEQ/L (ref 22–26)
ISTAT BLOOD GAS HCO3: 26.8 MEQ/L (ref 22–26)
ISTAT BLOOD GAS O2 SATURATION: 100 % (ref 92–100)
ISTAT BLOOD GAS O2 SATURATION: 99 % (ref 92–100)
ISTAT BLOOD GAS PCO2: 39.1 MMHG (ref 35–45)
ISTAT BLOOD GAS PCO2: 42.1 MMHG (ref 35–45)
ISTAT BLOOD GAS PCO2: 44.3 MMHG (ref 35–45)
ISTAT BLOOD GAS PCO2: 46.2 MMHG (ref 35–45)
ISTAT BLOOD GAS PCO2: 46.5 MMHG (ref 35–45)
ISTAT BLOOD GAS PH: 7.32 (ref 7.35–7.45)
ISTAT BLOOD GAS PH: 7.33 (ref 7.35–7.45)
ISTAT BLOOD GAS PH: 7.36 (ref 7.35–7.45)
ISTAT BLOOD GAS PH: 7.37 (ref 7.35–7.45)
ISTAT BLOOD GAS PH: 7.37 (ref 7.35–7.45)
ISTAT BLOOD GAS PO2: 121 MMHG (ref 80–105)
ISTAT BLOOD GAS PO2: 267 MMHG (ref 80–105)
ISTAT BLOOD GAS PO2: 357 MMHG (ref 80–105)
ISTAT BLOOD GAS PO2: >400 MMHG (ref 80–105)
ISTAT BLOOD GAS PO2: >400 MMHG (ref 80–105)
ISTAT BLOOD GAS TCO2: 22 MMOL/L (ref 22–32)
ISTAT BLOOD GAS TCO2: 24 MMOL/L (ref 22–32)
ISTAT BLOOD GAS TCO2: 26 MMOL/L (ref 22–32)
ISTAT BLOOD GAS TCO2: 28 MMOL/L (ref 22–32)
ISTAT BLOOD GAS TCO2: 28 MMOL/L (ref 22–32)
ISTAT HEMATOCRIT: 25 %
ISTAT HEMATOCRIT: 26 %
ISTAT HEMATOCRIT: 33 %
ISTAT HEMATOCRIT: 37 %
ISTAT HEMATOCRIT: 41 %
ISTAT IONIZED CALCIUM: 0.97 MMOL/L (ref 1.12–1.32)
ISTAT IONIZED CALCIUM: 0.98 MMOL/L (ref 1.12–1.32)
ISTAT IONIZED CALCIUM: 1.15 MMOL/L (ref 1.12–1.32)
ISTAT IONIZED CALCIUM: 1.16 MMOL/L (ref 1.12–1.32)
ISTAT IONIZED CALCIUM: 1.17 MMOL/L (ref 1.12–1.32)
ISTAT POTASSIUM: 4.3 MMOL/L (ref 3.6–5.1)
ISTAT POTASSIUM: 4.5 MMOL/L (ref 3.6–5.1)
ISTAT POTASSIUM: 4.7 MMOL/L (ref 3.6–5.1)
ISTAT POTASSIUM: 6.7 MMOL/L (ref 3.6–5.1)
ISTAT POTASSIUM: 7.1 MMOL/L (ref 3.6–5.1)
ISTAT SODIUM: 127 MMOL/L (ref 136–145)
ISTAT SODIUM: 130 MMOL/L (ref 136–145)
ISTAT SODIUM: 138 MMOL/L (ref 136–145)
ISTAT SODIUM: 139 MMOL/L (ref 136–145)
ISTAT SODIUM: 140 MMOL/L (ref 136–145)
LACTIC ACID ARTERIAL: 1.6 MMOL/L (ref 0.5–2)
MCH RBC QN AUTO: 29.5 PG (ref 26–34)
MCHC RBC AUTO-ENTMCNC: 35 G/DL (ref 31–37)
MCV RBC AUTO: 84.4 FL
METHEMOGLOBIN: 0.6 % SAT (ref 0.4–1.5)
OSMOLALITY SERPL CALC.SUM OF ELEC: 295 MOSM/KG (ref 275–295)
P/F RATIO: 323.8 MMHG
PATIENT TEMPERATURE: 97.8 F
PEEP: 5 CM H2O
PLATELET # BLD AUTO: 122 10(3)UL (ref 150–450)
PLATELET # BLD AUTO: 130 10(3)UL (ref 150–450)
PLATELET # BLD AUTO: 204 10(3)UL (ref 150–450)
POTASSIUM BLOOD GAS: 5 MMOL/L (ref 3.6–5.1)
POTASSIUM SERPL-SCNC: 4.1 MMOL/L (ref 3.5–5.1)
POTASSIUM SERPL-SCNC: 4.4 MMOL/L (ref 3.5–5.1)
PSA SERPL DL<=0.01 NG/ML-MCNC: 13.1 SECONDS (ref 12.4–14.6)
PSA SERPL DL<=0.01 NG/ML-MCNC: 15.6 SECONDS (ref 12.4–14.6)
PSA SERPL DL<=0.01 NG/ML-MCNC: 16.5 SECONDS (ref 12.4–14.6)
RBC # BLD AUTO: 4.54 X10(6)UL
SARS-COV-2 RNA RESP QL NAA+PROBE: NOT DETECTED
SODIUM BLOOD GAS: 137 MMOL/L (ref 136–144)
SODIUM SERPL-SCNC: 138 MMOL/L (ref 136–145)
SODIUM SERPL-SCNC: 141 MMOL/L (ref 136–145)
TIDAL VOLUME: 500 ML
TOTAL HEMOGLOBIN: 13.4 G/DL
VENT RATE: 14 /MIN
WBC # BLD AUTO: 15.8 X10(3) UL (ref 4–11)

## 2021-02-10 PROCEDURE — 06BQ4ZZ EXCISION OF LEFT SAPHENOUS VEIN, PERCUTANEOUS ENDOSCOPIC APPROACH: ICD-10-PCS | Performed by: THORACIC SURGERY (CARDIOTHORACIC VASCULAR SURGERY)

## 2021-02-10 PROCEDURE — 021109W BYPASS CORONARY ARTERY, TWO ARTERIES FROM AORTA WITH AUTOLOGOUS VENOUS TISSUE, OPEN APPROACH: ICD-10-PCS | Performed by: THORACIC SURGERY (CARDIOTHORACIC VASCULAR SURGERY)

## 2021-02-10 PROCEDURE — 76942 ECHO GUIDE FOR BIOPSY: CPT | Performed by: ANESTHESIOLOGY

## 2021-02-10 PROCEDURE — 02B70ZK EXCISION OF LEFT ATRIAL APPENDAGE, OPEN APPROACH: ICD-10-PCS | Performed by: THORACIC SURGERY (CARDIOTHORACIC VASCULAR SURGERY)

## 2021-02-10 PROCEDURE — 99291 CRITICAL CARE FIRST HOUR: CPT | Performed by: INTERNAL MEDICINE

## 2021-02-10 PROCEDURE — 99232 SBSQ HOSP IP/OBS MODERATE 35: CPT | Performed by: HOSPITALIST

## 2021-02-10 PROCEDURE — 30233N0 TRANSFUSION OF AUTOLOGOUS RED BLOOD CELLS INTO PERIPHERAL VEIN, PERCUTANEOUS APPROACH: ICD-10-PCS | Performed by: THORACIC SURGERY (CARDIOTHORACIC VASCULAR SURGERY)

## 2021-02-10 PROCEDURE — 02100Z9 BYPASS CORONARY ARTERY, ONE ARTERY FROM LEFT INTERNAL MAMMARY, OPEN APPROACH: ICD-10-PCS | Performed by: THORACIC SURGERY (CARDIOTHORACIC VASCULAR SURGERY)

## 2021-02-10 PROCEDURE — 93312 ECHO TRANSESOPHAGEAL: CPT | Performed by: ANESTHESIOLOGY

## 2021-02-10 PROCEDURE — 71045 X-RAY EXAM CHEST 1 VIEW: CPT | Performed by: THORACIC SURGERY (CARDIOTHORACIC VASCULAR SURGERY)

## 2021-02-10 PROCEDURE — B24BZZ4 ULTRASONOGRAPHY OF HEART WITH AORTA, TRANSESOPHAGEAL: ICD-10-PCS | Performed by: ANESTHESIOLOGY

## 2021-02-10 PROCEDURE — 02580ZZ DESTRUCTION OF CONDUCTION MECHANISM, OPEN APPROACH: ICD-10-PCS | Performed by: THORACIC SURGERY (CARDIOTHORACIC VASCULAR SURGERY)

## 2021-02-10 PROCEDURE — 5A1221Z PERFORMANCE OF CARDIAC OUTPUT, CONTINUOUS: ICD-10-PCS | Performed by: THORACIC SURGERY (CARDIOTHORACIC VASCULAR SURGERY)

## 2021-02-10 RX ORDER — SODIUM PHOSPHATE, DIBASIC AND SODIUM PHOSPHATE, MONOBASIC 7; 19 G/133ML; G/133ML
1 ENEMA RECTAL ONCE AS NEEDED
Status: DISCONTINUED | OUTPATIENT
Start: 2021-02-10 | End: 2021-02-17

## 2021-02-10 RX ORDER — MELATONIN
3 NIGHTLY PRN
Status: DISCONTINUED | OUTPATIENT
Start: 2021-02-10 | End: 2021-02-17

## 2021-02-10 RX ORDER — VANCOMYCIN HYDROCHLORIDE
15 EVERY 24 HOURS
Status: COMPLETED | OUTPATIENT
Start: 2021-02-11 | End: 2021-02-11

## 2021-02-10 RX ORDER — DOBUTAMINE HYDROCHLORIDE 200 MG/100ML
INJECTION INTRAVENOUS CONTINUOUS PRN
Status: DISCONTINUED | OUTPATIENT
Start: 2021-02-10 | End: 2021-02-10 | Stop reason: SURG

## 2021-02-10 RX ORDER — MIDAZOLAM HYDROCHLORIDE 1 MG/ML
INJECTION INTRAMUSCULAR; INTRAVENOUS AS NEEDED
Status: DISCONTINUED | OUTPATIENT
Start: 2021-02-10 | End: 2021-02-10 | Stop reason: SURG

## 2021-02-10 RX ORDER — CEFAZOLIN SODIUM/WATER 2 G/20 ML
2 SYRINGE (ML) INTRAVENOUS EVERY 8 HOURS
Status: DISCONTINUED | OUTPATIENT
Start: 2021-02-10 | End: 2021-02-10

## 2021-02-10 RX ORDER — MORPHINE SULFATE 4 MG/ML
4 INJECTION, SOLUTION INTRAMUSCULAR; INTRAVENOUS EVERY 2 HOUR PRN
Status: DISCONTINUED | OUTPATIENT
Start: 2021-02-10 | End: 2021-02-16

## 2021-02-10 RX ORDER — ONDANSETRON 2 MG/ML
4 INJECTION INTRAMUSCULAR; INTRAVENOUS EVERY 6 HOURS PRN
Status: DISCONTINUED | OUTPATIENT
Start: 2021-02-10 | End: 2021-02-17

## 2021-02-10 RX ORDER — DEXMEDETOMIDINE HYDROCHLORIDE 4 UG/ML
INJECTION, SOLUTION INTRAVENOUS CONTINUOUS PRN
Status: DISCONTINUED | OUTPATIENT
Start: 2021-02-10 | End: 2021-02-10 | Stop reason: SURG

## 2021-02-10 RX ORDER — MAGNESIUM SULFATE 1 G/100ML
1 INJECTION INTRAVENOUS AS NEEDED
Status: DISCONTINUED | OUTPATIENT
Start: 2021-02-10 | End: 2021-02-16

## 2021-02-10 RX ORDER — DOBUTAMINE HYDROCHLORIDE 200 MG/100ML
INJECTION INTRAVENOUS CONTINUOUS PRN
Status: DISCONTINUED | OUTPATIENT
Start: 2021-02-10 | End: 2021-02-16

## 2021-02-10 RX ORDER — CEFAZOLIN SODIUM/WATER 2 G/20 ML
SYRINGE (ML) INTRAVENOUS AS NEEDED
Status: DISCONTINUED | OUTPATIENT
Start: 2021-02-10 | End: 2021-02-10 | Stop reason: SURG

## 2021-02-10 RX ORDER — MORPHINE SULFATE 2 MG/ML
2 INJECTION, SOLUTION INTRAMUSCULAR; INTRAVENOUS EVERY 2 HOUR PRN
Status: DISCONTINUED | OUTPATIENT
Start: 2021-02-10 | End: 2021-02-16

## 2021-02-10 RX ORDER — SODIUM CHLORIDE 9 MG/ML
INJECTION, SOLUTION INTRAVENOUS CONTINUOUS
Status: DISCONTINUED | OUTPATIENT
Start: 2021-02-10 | End: 2021-02-12

## 2021-02-10 RX ORDER — POLYETHYLENE GLYCOL 3350 17 G/17G
17 POWDER, FOR SOLUTION ORAL DAILY PRN
Status: DISCONTINUED | OUTPATIENT
Start: 2021-02-10 | End: 2021-02-17

## 2021-02-10 RX ORDER — ROCURONIUM BROMIDE 10 MG/ML
INJECTION, SOLUTION INTRAVENOUS AS NEEDED
Status: DISCONTINUED | OUTPATIENT
Start: 2021-02-10 | End: 2021-02-10 | Stop reason: SURG

## 2021-02-10 RX ORDER — POTASSIUM CHLORIDE 29.8 MG/ML
40 INJECTION INTRAVENOUS AS NEEDED
Status: DISCONTINUED | OUTPATIENT
Start: 2021-02-10 | End: 2021-02-16

## 2021-02-10 RX ORDER — NITROGLYCERIN 20 MG/100ML
INJECTION INTRAVENOUS CONTINUOUS PRN
Status: DISCONTINUED | OUTPATIENT
Start: 2021-02-10 | End: 2021-02-16

## 2021-02-10 RX ORDER — LIDOCAINE HYDROCHLORIDE 10 MG/ML
INJECTION, SOLUTION EPIDURAL; INFILTRATION; INTRACAUDAL; PERINEURAL AS NEEDED
Status: DISCONTINUED | OUTPATIENT
Start: 2021-02-10 | End: 2021-02-10 | Stop reason: SURG

## 2021-02-10 RX ORDER — ALBUMIN, HUMAN INJ 5% 5 %
250 SOLUTION INTRAVENOUS ONCE AS NEEDED
Status: COMPLETED | OUTPATIENT
Start: 2021-02-10 | End: 2021-02-10

## 2021-02-10 RX ORDER — MAGNESIUM SULFATE HEPTAHYDRATE 40 MG/ML
2 INJECTION, SOLUTION INTRAVENOUS AS NEEDED
Status: DISCONTINUED | OUTPATIENT
Start: 2021-02-10 | End: 2021-02-16

## 2021-02-10 RX ORDER — CEFAZOLIN SODIUM/WATER 2 G/20 ML
2 SYRINGE (ML) INTRAVENOUS EVERY 12 HOURS
Status: DISCONTINUED | OUTPATIENT
Start: 2021-02-11 | End: 2021-02-12

## 2021-02-10 RX ORDER — PROTAMINE SULFATE 10 MG/ML
INJECTION, SOLUTION INTRAVENOUS AS NEEDED
Status: DISCONTINUED | OUTPATIENT
Start: 2021-02-10 | End: 2021-02-10 | Stop reason: SURG

## 2021-02-10 RX ORDER — HYDROCODONE BITARTRATE AND ACETAMINOPHEN 10; 325 MG/1; MG/1
1 TABLET ORAL EVERY 4 HOURS PRN
Status: DISCONTINUED | OUTPATIENT
Start: 2021-02-10 | End: 2021-02-17

## 2021-02-10 RX ORDER — HYDROCODONE BITARTRATE AND ACETAMINOPHEN 10; 325 MG/1; MG/1
2 TABLET ORAL EVERY 4 HOURS PRN
Status: DISCONTINUED | OUTPATIENT
Start: 2021-02-10 | End: 2021-02-17

## 2021-02-10 RX ORDER — DEXMEDETOMIDINE HYDROCHLORIDE 4 UG/ML
INJECTION, SOLUTION INTRAVENOUS CONTINUOUS
Status: DISCONTINUED | OUTPATIENT
Start: 2021-02-10 | End: 2021-02-11

## 2021-02-10 RX ORDER — CEFAZOLIN SODIUM 1 G/3ML
INJECTION, POWDER, FOR SOLUTION INTRAMUSCULAR; INTRAVENOUS AS NEEDED
Status: DISCONTINUED | OUTPATIENT
Start: 2021-02-10 | End: 2021-02-10 | Stop reason: SURG

## 2021-02-10 RX ORDER — DOCUSATE SODIUM 100 MG/1
100 CAPSULE, LIQUID FILLED ORAL 2 TIMES DAILY
Status: DISCONTINUED | OUTPATIENT
Start: 2021-02-10 | End: 2021-02-17

## 2021-02-10 RX ORDER — BISACODYL 10 MG
10 SUPPOSITORY, RECTAL RECTAL
Status: DISCONTINUED | OUTPATIENT
Start: 2021-02-10 | End: 2021-02-17

## 2021-02-10 RX ORDER — PANTOPRAZOLE SODIUM 40 MG/1
40 TABLET, DELAYED RELEASE ORAL
Status: DISCONTINUED | OUTPATIENT
Start: 2021-02-11 | End: 2021-02-17

## 2021-02-10 RX ORDER — IPRATROPIUM BROMIDE AND ALBUTEROL SULFATE 2.5; .5 MG/3ML; MG/3ML
3 SOLUTION RESPIRATORY (INHALATION) EVERY 4 HOURS
Status: DISCONTINUED | OUTPATIENT
Start: 2021-02-10 | End: 2021-02-11

## 2021-02-10 RX ORDER — POTASSIUM CHLORIDE 14.9 MG/ML
20 INJECTION INTRAVENOUS AS NEEDED
Status: DISCONTINUED | OUTPATIENT
Start: 2021-02-10 | End: 2021-02-16

## 2021-02-10 RX ORDER — MIDAZOLAM HYDROCHLORIDE 1 MG/ML
1 INJECTION INTRAMUSCULAR; INTRAVENOUS EVERY 30 MIN PRN
Status: DISCONTINUED | OUTPATIENT
Start: 2021-02-10 | End: 2021-02-11

## 2021-02-10 RX ORDER — MORPHINE SULFATE 4 MG/ML
6 INJECTION, SOLUTION INTRAMUSCULAR; INTRAVENOUS EVERY 2 HOUR PRN
Status: DISCONTINUED | OUTPATIENT
Start: 2021-02-10 | End: 2021-02-16

## 2021-02-10 RX ORDER — HEPARIN SODIUM 1000 [USP'U]/ML
INJECTION, SOLUTION INTRAVENOUS; SUBCUTANEOUS AS NEEDED
Status: DISCONTINUED | OUTPATIENT
Start: 2021-02-10 | End: 2021-02-10 | Stop reason: SURG

## 2021-02-10 RX ORDER — DEXTROSE MONOHYDRATE 25 G/50ML
50 INJECTION, SOLUTION INTRAVENOUS
Status: DISCONTINUED | OUTPATIENT
Start: 2021-02-10 | End: 2021-02-11 | Stop reason: SDUPTHER

## 2021-02-10 RX ORDER — CHLORHEXIDINE GLUCONATE 0.12 MG/ML
15 RINSE ORAL
Status: DISCONTINUED | OUTPATIENT
Start: 2021-02-10 | End: 2021-02-11

## 2021-02-10 RX ORDER — DEXTROSE AND SODIUM CHLORIDE 5; .45 G/100ML; G/100ML
INJECTION, SOLUTION INTRAVENOUS CONTINUOUS
Status: DISCONTINUED | OUTPATIENT
Start: 2021-02-10 | End: 2021-02-12

## 2021-02-10 RX ORDER — VANCOMYCIN HYDROCHLORIDE 1 G/20ML
INJECTION, POWDER, LYOPHILIZED, FOR SOLUTION INTRAVENOUS AS NEEDED
Status: DISCONTINUED | OUTPATIENT
Start: 2021-02-10 | End: 2021-02-10 | Stop reason: SURG

## 2021-02-10 RX ADMIN — HEPARIN SODIUM 22000 UNITS: 1000 INJECTION, SOLUTION INTRAVENOUS; SUBCUTANEOUS at 13:49:00

## 2021-02-10 RX ADMIN — HEPARIN SODIUM 5000 UNITS: 1000 INJECTION, SOLUTION INTRAVENOUS; SUBCUTANEOUS at 13:41:00

## 2021-02-10 RX ADMIN — PROTAMINE SULFATE 250 MG: 10 INJECTION, SOLUTION INTRAVENOUS at 15:45:00

## 2021-02-10 RX ADMIN — CEFAZOLIN SODIUM 2 G: 1 INJECTION, POWDER, FOR SOLUTION INTRAMUSCULAR; INTRAVENOUS at 15:53:00

## 2021-02-10 RX ADMIN — MIDAZOLAM HYDROCHLORIDE 5 MG: 1 INJECTION INTRAMUSCULAR; INTRAVENOUS at 11:32:00

## 2021-02-10 RX ADMIN — ROCURONIUM BROMIDE 100 MG: 10 INJECTION, SOLUTION INTRAVENOUS at 13:46:00

## 2021-02-10 RX ADMIN — VANCOMYCIN HYDROCHLORIDE 1000 MG: 1 INJECTION, POWDER, LYOPHILIZED, FOR SOLUTION INTRAVENOUS at 12:00:00

## 2021-02-10 RX ADMIN — DOBUTAMINE HYDROCHLORIDE 4 MCG/KG/MIN: 200 INJECTION INTRAVENOUS at 15:34:00

## 2021-02-10 RX ADMIN — DOBUTAMINE HYDROCHLORIDE 2 MCG/KG/MIN: 200 INJECTION INTRAVENOUS at 13:15:00

## 2021-02-10 RX ADMIN — CEFAZOLIN SODIUM/WATER 2 G: 2 G/20 ML SYRINGE (ML) INTRAVENOUS at 11:55:00

## 2021-02-10 RX ADMIN — ROCURONIUM BROMIDE 100 MG: 10 INJECTION, SOLUTION INTRAVENOUS at 11:35:00

## 2021-02-10 RX ADMIN — LIDOCAINE HYDROCHLORIDE 50 MG: 10 INJECTION, SOLUTION EPIDURAL; INFILTRATION; INTRACAUDAL; PERINEURAL at 11:35:00

## 2021-02-10 RX ADMIN — DEXMEDETOMIDINE HYDROCHLORIDE 0.5 MCG/KG/HR: 4 INJECTION, SOLUTION INTRAVENOUS at 12:00:00

## 2021-02-10 RX ADMIN — DOBUTAMINE HYDROCHLORIDE 6 MCG/KG/MIN: 200 INJECTION INTRAVENOUS at 15:44:00

## 2021-02-10 NOTE — PLAN OF CARE
Alert. Oriented. Afib per tele. Hr 70s. Sbp 120s. Denies pain. Rt groin soft. Vss. Hep gtt infusing. Ivf infusing. Poc discussed w/ pt. Incentive spirometry up to 1750. Cont. to monitor pt.

## 2021-02-10 NOTE — PROGRESS NOTES
[facility]  Progress Note     Elver Primleona Patient Status:  Inpatient    3/26/1955 MRN [id number]   [facility] 8NE-A Attending Cary Miranda MD   Hosp Day # 6 PCP Safia Loco MD     Chief Complaint: afib    S: Patient s Lab 02/04/21  1231 02/10/21  0558   PTP 12.6 13.1   INR 0.92 0.96       Recent Labs   Lab 02/04/21  1231   TROP <0.045            Imaging: Imaging data reviewed in Epic.     Medications:   • [MAR Hold] Insulin Aspart Pen  2-10 Units Subcutaneous TID CC an

## 2021-02-10 NOTE — ANESTHESIA PREPROCEDURE EVALUATION
PRE-OP EVALUATION    Patient Name: Joseph Toribio    Pre-op Diagnosis: coronary artery disease    Procedure(s):  coronary artery bypass graft    Surgeon(s) and Role:     * Jolynn Anaya MD - Primary    Pre-op vitals reviewed.   Temp: 98.3 °F (36.8 °C) Daily    •  [COMPLETED] hydrALAzine HCl (APRESOLINE) injection 10 mg, 10 mg, Intravenous, Once    •  hydrALAzine HCl (APRESOLINE) injection 10 mg, 10 mg, Intravenous, Q6H PRN    •  metoprolol Tartrate (LOPRESSOR) tab 25 mg, 25 mg, Oral, TID Beta Blocker/Ca 1 tablet (300 mg total) by mouth nightly., Disp: 90 tablet, Rfl: 0, 2/4/2021 at 0900        Allergies: Patient has no known allergies. Anesthesia Evaluation    Patient summary reviewed.     Anesthetic Complications  (-) history of anesthetic complicati cm  Neck ROM: full Cardiovascular      Rhythm: regular  Rate: normal  (-) murmur   Dental  Comment: Dentition is grossly intact; multiple gaps, vulnerable teeth, none able to be moved by patient finger. No notable dental history.          Pulmonary  Commen

## 2021-02-10 NOTE — ANESTHESIA PROCEDURE NOTES
Central Line  Performed by: Yeyo Calderon MD  Authorized by: Yeyo Calderon MD     General Information and Staff    Procedure Start:   Anesthesiologist: Yeyo Calderon MD  Performed by:   Anesthesiologist  Patient Location:  OR  Indication: central veno

## 2021-02-10 NOTE — PROGRESS NOTES
[facility] [address]  [place], [address]  ?  02/10/21  ? Re: Arthur Huffman  ? To Whom It May Concern:    Arthur Huffman was admitted to [facility] on 2/4/2021 and is expected to remain in hospital until  02/15/21.     P

## 2021-02-10 NOTE — ANESTHESIA PROCEDURE NOTES
Arterial Line  Performed by: Ankur Moraes MD  Authorized by: Ankur Moraes MD     General Information and Staff    Procedure Start:   Anesthesiologist: Ankur Moraes MD  Performed By:  Anesthesiologist  Patient Location:  OR  Indication: continuou

## 2021-02-10 NOTE — PROGRESS NOTES
[facility]  Cardiology critical care  5:15 PM to 5:58 PM    Jacinda Barfield Patient Status:  Inpatient    3/26/1955 MRN [id number]   [facility] 6NE-A Attending Roosevelt Redman, [address] [facility] Day # 6 PCP Ayleen De La Rosa in situ    Laboratory/Data:    Labs:       Recent Labs   Lab 02/04/21  1231 02/05/21  0401 02/09/21  0944 02/10/21  0558 02/10/21  1632   WBC 9.1 7.3 8.8  --   --    HGB 17.2 14.3 14.9  --   --    MCV 84.6 87.2 88.4  --   --    .0 182.0 191.0 204.0 Rule out pneumo  PATIENT STATED HISTORY: (As transcribed by Technologist)  Maureen Avitia op chest xray              CONCLUSION:    Endotracheal tube present with the tip 5.1 cm, nasogastric tube in the stomach, [place]-Bren catheter tip in the main pulmonary outflow, consult the radiology report for interpretation of non-cardiac structures.  Lurdes Junior MD 2/9/2021 1:23 PM      Medications:    •  Midazolam HCl (VERSED) 2 MG/2ML injection 1 mg, 1 mg, Intravenous, Q30 Min PRN    •  Dexmedetomidine HCl in NaCl (PRECE infusion, 0.5-30 mcg/min, Intravenous, Continuous PRN    •  Nitroprusside Sodium (NIPRIDE) 50 mg in dextrose 5 % 250 mL infusion, 0.1-4 mcg/kg/min (Dosing Weight), Intravenous, Continuous PRN    •  metoprolol Tartrate (LOPRESSOR) tab 25 mg, 25 mg, Oral, 2x atorvastatin (LIPITOR) tab 40 mg, 40 mg, Oral, Nightly    •  influenza vaccine (PF) (FLUZONE HD) high dose for 65 yrs & older inj 0.7ml, 0.7 mL, Intramuscular, Prior to discharge      •  ceFAZolin sodium (ANCEF/KEFZOL) 2 GM/20ML premix IV syringe, , , PRN addition to CABG today. 5.  Uncontrolled systemic hypertension on admission -BP on low side now supported with pressors postop. 6.  Hypercholesterolemia -statin initiated on admission.   7.  Diabetes mellitus type 2 -on Metformin at home, on IV insulin

## 2021-02-10 NOTE — ANESTHESIA PROCEDURE NOTES
Procedure Performed: LAVONNE    Start Time:        End Time:      Preanesthesia Checklist:  Patient identified, IV assessed, risks and benefits discussed, monitors and equipment assessed, procedure being performed at surgeon's request and anesthesia consent ob thickness greater than 3 mm. Mobile plaque not present. Atria    Right Atrium:  Size normal.  Spontaneous echo contrast not present. Thrombus not present. Tumor not present. Device present. Left Atrium:  Size dilated.   Spontaneous echo cont

## 2021-02-10 NOTE — ANESTHESIA PROCEDURE NOTES
Airway  Urgency: elective      General Information and Staff    Patient location during procedure: OR  Anesthesiologist: Sharlee Lesches, MD  Performed: anesthesiologist     Indications and Patient Condition  Indications for airway management: anesthesia  S

## 2021-02-10 NOTE — PLAN OF CARE
AOx4.  Calm, cooperative. Neuro check intact. Independent. Afb on cardiac monitor, HR 80-90's. Adequate saturation on RA. Lung sounds clear. Achieving 1250 on IS. Denies sob, chest discomfort, n/v.  Denies pain. Voiding without difficulty.   LBM yes Angina  - Evaluate fluid balance, assess for edema, trend weights  Outcome: Progressing  Goal: Absence of cardiac arrhythmias or at baseline  Description: INTERVENTIONS:  - Continuous cardiac monitoring, monitor vital signs, obtain 12 lead EKG if indicated Monitor response to electrolyte replacements, including rhythm and repeat lab results as appropriate  - Fluid restriction as ordered  - Instruct patient on fluid and nutrition restrictions as appropriate  Outcome: Progressing  Goal: Hemodynamic stability a

## 2021-02-11 ENCOUNTER — APPOINTMENT (OUTPATIENT)
Dept: GENERAL RADIOLOGY | Facility: HOSPITAL | Age: 66
DRG: 228 | End: 2021-02-11
Attending: THORACIC SURGERY (CARDIOTHORACIC VASCULAR SURGERY)
Payer: COMMERCIAL

## 2021-02-11 LAB
ARTERIAL BLD GAS O2 SATURATION: 96 % (ref 92–100)
ARTERIAL BLOOD GAS BASE EXCESS: -4.6 MMOL/L (ref ?–2)
ARTERIAL BLOOD GAS HCO3: 19.9 MEQ/L (ref 22–26)
ARTERIAL BLOOD GAS PCO2: 35 MM HG (ref 35–45)
ARTERIAL BLOOD GAS PH: 7.37 (ref 7.35–7.45)
ARTERIAL BLOOD GAS PO2: 95 MM HG (ref 80–105)
ATRIAL RATE: 82 BPM
ATRIAL RATE: 89 BPM
BASOPHILS # BLD AUTO: 0.02 X10(3) UL (ref 0–0.2)
BASOPHILS NFR BLD AUTO: 0.1 %
BUN BLD-MCNC: 34 MG/DL (ref 7–18)
CALCIUM BLD-MCNC: 7.5 MG/DL (ref 8.5–10.1)
CALCULATED O2 SATURATION: 97 % (ref 92–100)
CARBOXYHEMOGLOBIN: 1.4 % SAT (ref 0–3)
CHLORIDE SERPL-SCNC: 109 MMOL/L (ref 98–112)
CO2 SERPL-SCNC: 21 MMOL/L (ref 21–32)
CREAT BLD-MCNC: 2.97 MG/DL
CREAT UR-SCNC: 76.1 MG/DL
DEPRECATED RDW RBC AUTO: 40.5 FL (ref 35.1–46.3)
EOSINOPHIL # BLD AUTO: 0 X10(3) UL (ref 0–0.7)
EOSINOPHIL NFR BLD AUTO: 0 %
ERYTHROCYTE [DISTWIDTH] IN BLOOD BY AUTOMATED COUNT: 13.1 % (ref 11–15)
FIO2: 40 %
GLUCOSE BLD-MCNC: 110 MG/DL (ref 70–99)
GLUCOSE BLD-MCNC: 113 MG/DL (ref 70–99)
GLUCOSE BLD-MCNC: 114 MG/DL (ref 70–99)
GLUCOSE BLD-MCNC: 120 MG/DL (ref 70–99)
GLUCOSE BLD-MCNC: 121 MG/DL (ref 70–99)
GLUCOSE BLD-MCNC: 122 MG/DL (ref 70–99)
GLUCOSE BLD-MCNC: 124 MG/DL (ref 70–99)
GLUCOSE BLD-MCNC: 133 MG/DL (ref 70–99)
GLUCOSE BLD-MCNC: 135 MG/DL (ref 70–99)
GLUCOSE BLD-MCNC: 137 MG/DL (ref 70–99)
GLUCOSE BLD-MCNC: 204 MG/DL (ref 70–99)
GLUCOSE BLD-MCNC: 242 MG/DL (ref 70–99)
GLUCOSE BLD-MCNC: 259 MG/DL (ref 70–99)
HAV IGM SER QL: 2.4 MG/DL (ref 1.6–2.6)
HCT VFR BLD AUTO: 36.2 %
HGB BLD-MCNC: 12.4 G/DL
IMM GRANULOCYTES # BLD AUTO: 0.08 X10(3) UL (ref 0–1)
IMM GRANULOCYTES NFR BLD: 0.5 %
IONIZED CALCIUM: 1.12 MMOL/L (ref 1.12–1.32)
LACTIC ACID ARTERIAL: 3.4 MMOL/L (ref 0.5–2)
LYMPHOCYTES # BLD AUTO: 0.42 X10(3) UL (ref 1–4)
LYMPHOCYTES NFR BLD AUTO: 2.6 %
MCH RBC QN AUTO: 29.5 PG (ref 26–34)
MCHC RBC AUTO-ENTMCNC: 34.3 G/DL (ref 31–37)
MCV RBC AUTO: 86.2 FL
METHEMOGLOBIN: 0.7 % SAT (ref 0.4–1.5)
MICROALBUMIN UR-MCNC: 119 MG/DL
MICROALBUMIN/CREAT 24H UR-RTO: 1563.7 UG/MG (ref ?–30)
MONOCYTES # BLD AUTO: 0.99 X10(3) UL (ref 0.1–1)
MONOCYTES NFR BLD AUTO: 6.2 %
NEUTROPHILS # BLD AUTO: 14.57 X10 (3) UL (ref 1.5–7.7)
NEUTROPHILS # BLD AUTO: 14.57 X10(3) UL (ref 1.5–7.7)
NEUTROPHILS NFR BLD AUTO: 90.6 %
P AXIS: 58 DEGREES
P AXIS: 58 DEGREES
P-R INTERVAL: 190 MS
P-R INTERVAL: 190 MS
P/F RATIO: 237.8 MMHG
PATIENT TEMPERATURE: 98.6 F
PEEP: 5 CM H2O
PLATELET # BLD AUTO: 130 10(3)UL (ref 150–450)
POTASSIUM BLOOD GAS: 4.1 MMOL/L (ref 3.6–5.1)
POTASSIUM SERPL-SCNC: 4.1 MMOL/L (ref 3.5–5.1)
PRESSURE SUPPORT: 5 CM H2O
Q-T INTERVAL: 380 MS
Q-T INTERVAL: 400 MS
QRS DURATION: 64 MS
QRS DURATION: 78 MS
QTC CALCULATION (BEZET): 462 MS
QTC CALCULATION (BEZET): 467 MS
R AXIS: 28 DEGREES
R AXIS: 30 DEGREES
RBC # BLD AUTO: 4.2 X10(6)UL
SODIUM BLOOD GAS: 140 MMOL/L (ref 136–144)
SODIUM SERPL-SCNC: 139 MMOL/L (ref 136–145)
SODIUM SERPL-SCNC: <5 MMOL/L
T AXIS: 60 DEGREES
T AXIS: 60 DEGREES
TOTAL HEMOGLOBIN: 12.4 G/DL
VENTRICULAR RATE: 82 BPM
VENTRICULAR RATE: 89 BPM
WBC # BLD AUTO: 16.1 X10(3) UL (ref 4–11)

## 2021-02-11 PROCEDURE — 99254 IP/OBS CNSLTJ NEW/EST MOD 60: CPT | Performed by: INTERNAL MEDICINE

## 2021-02-11 PROCEDURE — 71045 X-RAY EXAM CHEST 1 VIEW: CPT | Performed by: THORACIC SURGERY (CARDIOTHORACIC VASCULAR SURGERY)

## 2021-02-11 PROCEDURE — 99291 CRITICAL CARE FIRST HOUR: CPT | Performed by: INTERNAL MEDICINE

## 2021-02-11 PROCEDURE — 99233 SBSQ HOSP IP/OBS HIGH 50: CPT | Performed by: HOSPITALIST

## 2021-02-11 RX ORDER — ALBUMIN, HUMAN INJ 5% 5 %
250 SOLUTION INTRAVENOUS ONCE
Status: COMPLETED | OUTPATIENT
Start: 2021-02-11 | End: 2021-02-11

## 2021-02-11 RX ORDER — DEXTROSE MONOHYDRATE 25 G/50ML
50 INJECTION, SOLUTION INTRAVENOUS
Status: DISCONTINUED | OUTPATIENT
Start: 2021-02-11 | End: 2021-02-17

## 2021-02-11 RX ORDER — AMIODARONE HYDROCHLORIDE 200 MG/1
400 TABLET ORAL 3 TIMES DAILY
Status: DISCONTINUED | OUTPATIENT
Start: 2021-02-11 | End: 2021-02-11

## 2021-02-11 RX ORDER — AMIODARONE HYDROCHLORIDE 200 MG/1
400 TABLET ORAL 3 TIMES DAILY
Status: DISCONTINUED | OUTPATIENT
Start: 2021-02-11 | End: 2021-02-15

## 2021-02-11 NOTE — PLAN OF CARE
Assumed patient care this PM. Patient intubated with light sedation. Patient woke up and followed commands. Patient nodded head yes to pain, morpine given.  gtt to keep index >2. Levo gtt titrated to keep SBP >90. Albumin x1 given.  Amio decreased to .5

## 2021-02-11 NOTE — CONSULTS
Pharmacy consulted to dose post-op antibiotics. SCr 1.93 mg/dL; estimated CrCl 36.3 mL/min. Will adjust Ancef to 2 grams IV every 12 hours x4 doses and vancomycin to 1.25 grams IV every 24 hours x1 dose.     Sandeep Em, PharmD  02/10/21 6:24 PM

## 2021-02-11 NOTE — PROGRESS NOTES
[facility]     CV Surgery Progress Note    [facility] Patient Status:  [facility]    3/26/1955 MRN [id number]   [facility] 6NE-A Attending Lazarus Handy,    Hosp Day # 7 PCP Juan José Stoll MD     Subjective:  No acute event active  Extremities: Warm, dry, no edema  Skin: sternotomy incision C/D/I with light dressing, ACE wrap to LLE intact   Neuro: no focal deficits     Assessment/Plan:   CAD s/p CABG x3, left atrial appendage ligation and pulmonary vein isolation on 2/10, PO

## 2021-02-11 NOTE — OCCUPATIONAL THERAPY NOTE
Received orders for OT. Pt requiring pressor support and amiodarone. Will re-attempt as schedule and pt's medical stability allow.

## 2021-02-11 NOTE — PROGRESS NOTES
[facility]  Progress Note     Alondra So Patient Status:  Inpatient    3/26/1955 MRN [id number]   [facility] 8NE-A Attending Shashank Hou MD   Hosp Day # 7 PCP Laura Woods MD     Chief Complaint: afib    S: Patient s in [address]     Medications:   • insulin detemir  10 Units Subcutaneous [email or web address]   • Insulin Aspart Pen  1-68 Units Subcutaneous TID CC   • Insulin Aspart Pen  1-10 Units Subcutaneous TID AC and HS   • Amiodarone HCl  400 mg Oral TID   • metoprolol tartra

## 2021-02-11 NOTE — PROGRESS NOTES
Patient is on vent settings noted below. Breath sounds are bilaterally clear/diminished. Patient is receiving nebulizer treatments Q4. Plan to keep patient intubated overnight, per MD. No changes made to vent settings. Will continue to monitor.       02/10/

## 2021-02-11 NOTE — DIETARY NOTE
[address] [facility]     Admitting diagnosis: Atrial fibrillation with RVR ([address] [I48.91]    Ht: 5' 8\"  Wt: 88.7 kg (195 lb 8.8 oz). Body mass index is 30.18 kg/m².   IBW: 70 kg    Labs/Meds reviewed    Diet: Orders

## 2021-02-11 NOTE — PLAN OF CARE
Received patient from CVOR S/P CABG X 3 and TALISHA amputation at 1730. Patient is intubated on precedex for sedation. Plan to keep patient intubated overnight and wean tomorrow morning. Patient is on Dobs to keep C.I >2, and Levo to keep SBP >90.  Amiodarone g

## 2021-02-11 NOTE — CONSULTS
[facility]  Report of Consultation    Sharyle Inch Patient Status:  Inpatient    3/26/1955 MRN [id number]   [facility] 6NE-A Attending Devi Jimenez, [address] [facility] Day # 7 PCP Reina Baca MD       Assessment / Plan:    1) HUSSAIN- injection 50 mL, 50 mL, Intravenous, Q15 Min PRN **OR** glucose (DEX4) oral liquid 30 g, 30 g, Oral, Q15 Min PRN **OR** Glucose-Vitamin C (DEX-4) chewable tab 8 tablet, 8 tablet, Oral, Q15 Min PRN  •  insulin detemir (LEVEMIR) 100 UNIT/ML flextouch 10 Unit Intravenous, Continuous PRN  •  nitroGLYCERIN infusion 50mg in D5W 250ml, 5-300 mcg/min, Intravenous, Continuous PRN  •  norepinephrine (LEVOPHED) 4 mg/250 ml premix infusion, 0.5-30 mcg/min, Intravenous, Continuous PRN  •  Nitroprusside Sodium (NIPRIDE) 5 point review of systems otherwise reviewed and negative. Physical Exam:  /72   Pulse 74   Temp 98.4 °F (36.9 °C) (Pulmonary Artery)   Resp 14   Wt 195 lb 8.8 oz (88.7 kg)   SpO2 98%   BMI 30.18 kg/m²   Temp (24hrs), Av.2 °F (36.8 °C), Min:97.

## 2021-02-11 NOTE — RESPIRATORY THERAPY NOTE
Patient extubated at [phone] and placed on a 2L NC, tolerated trial and post extubation well. Will continue to monitor closely.

## 2021-02-11 NOTE — PROGRESS NOTES
[facility]  [facility] Cardiology  Cardiology critical care note  9:05 AM till 9:47 AM    Bárbara Longoria Patient Status:  Inpatient    3/26/1955 MRN [id number]   [facility] 6NE-A Attending Jania Valdez, [address] [facility] Day # 7 MICHAEL Means Cranial nerves intact. Normal sensation and motor function. No focal deficits. Musculoskeletal: normal range of motion, normal muscle strength, no joint effusion. Integumentary: Warm and dry skin. No rashes. Psychiatric: no depression. Normal affect. sonographic appearance of the kidneys.     Dictated by (CST): Hilaria Barton MD on 2/07/2021 at 4:50 PM     Finalized by (CST): Hilaria Barton MD on 2/07/2021 at 4:51 PM       Xr Chest Ap Portable  (cpt=71045)    Result Date: 2/11/2021  PROCEDURE:  XR 2/09/2021 at 1:12 PM       Cta Gated Thoracic Aorta (cpt=71275)    Result Date: 2/9/2021  PROCEDURE:  CTA GATED THORACIC AORTA (CPT=71275) Once DATE:    2/9/21 COMPARISON:     CT     SUMMARY:  Severe, three vessel native coronary artery disease as outlined uncontrolled diabetes with diabetic nephropathy and fluid shifts perioperatively -hope APN will plateau and will recover improving to 3 days  8. Diabetes mellitus type 2 -on Metformin at home, on IV insulin drip here.   9.  Chronic kidney disease stage III

## 2021-02-11 NOTE — ANESTHESIA POSTPROCEDURE EVALUATION
[address] Patient Status:  Inpatient   Age/Gender 72year old male MRN [id number]   [facility] 6NE-A Attending Brittanie Ren, [address] [facility] Day # 6 PCP Jarrell Starkey MD       Anesthesia Post-op Note    Procedure(s):

## 2021-02-11 NOTE — CONSULTS
[address]    PATIENT'S NAME: Renetta Comas   ATTENDING PHYSICIAN: Rell Ernandez. González Rincon PHYSICIAN: Opal Robert M.D.    PATIENT ACCOUNT#:   [de-identified]    LOCATION:  [facility]  MEDICAL RECORD #:   [id number]       DATE OF BI anteriorly. HEART:  Regular rate and rhythm. ABDOMEN:  Soft, nontender. EXTREMITIES:  No cyanosis, clubbing, or edema. NEUROLOGIC:  Moving all 4 extremities equally. SKIN:  Warm and dry. PSYCHIATRIC:  Appropriate mood and affect.     LABORATORY DATA:

## 2021-02-11 NOTE — PHYSICAL THERAPY NOTE
Physical Therapy    Orders for PT received. Pt requiring pressor support and amiodarone. Will re-attempt as schedule and pt's medical stability allow.

## 2021-02-12 LAB
ANION GAP SERPL CALC-SCNC: 13 MMOL/L (ref 0–18)
ANION GAP SERPL CALC-SCNC: 14 MMOL/L (ref 0–18)
BUN BLD-MCNC: 52 MG/DL (ref 7–18)
BUN BLD-MCNC: 56 MG/DL (ref 7–18)
BUN/CREAT SERPL: 10.5 (ref 10–20)
BUN/CREAT SERPL: 9.4 (ref 10–20)
CALCIUM BLD-MCNC: 7.4 MG/DL (ref 8.5–10.1)
CALCIUM BLD-MCNC: 8.3 MG/DL (ref 8.5–10.1)
CHLORIDE SERPL-SCNC: 101 MMOL/L (ref 98–112)
CHLORIDE SERPL-SCNC: 102 MMOL/L (ref 98–112)
CO2 SERPL-SCNC: 17 MMOL/L (ref 21–32)
CO2 SERPL-SCNC: 23 MMOL/L (ref 21–32)
CREAT BLD-MCNC: 5.33 MG/DL
CREAT BLD-MCNC: 5.55 MG/DL
DEPRECATED RDW RBC AUTO: 43.8 FL (ref 35.1–46.3)
ERYTHROCYTE [DISTWIDTH] IN BLOOD BY AUTOMATED COUNT: 13.7 % (ref 11–15)
GLUCOSE BLD-MCNC: 120 MG/DL (ref 70–99)
GLUCOSE BLD-MCNC: 158 MG/DL (ref 70–99)
GLUCOSE BLD-MCNC: 172 MG/DL (ref 70–99)
GLUCOSE BLD-MCNC: 215 MG/DL (ref 70–99)
GLUCOSE BLD-MCNC: 220 MG/DL (ref 70–99)
GLUCOSE BLD-MCNC: 226 MG/DL (ref 70–99)
HAV IGM SER QL: 2.4 MG/DL (ref 1.6–2.6)
HCT VFR BLD AUTO: 37.1 %
HGB BLD-MCNC: 12.5 G/DL
MCH RBC QN AUTO: 29.8 PG (ref 26–34)
MCHC RBC AUTO-ENTMCNC: 33.7 G/DL (ref 31–37)
MCV RBC AUTO: 88.5 FL
OSMOLALITY SERPL CALC.SUM OF ELEC: 297 MOSM/KG (ref 275–295)
OSMOLALITY SERPL CALC.SUM OF ELEC: 303 MOSM/KG (ref 275–295)
PHOSPHATE SERPL-MCNC: 7.8 MG/DL (ref 2.5–4.9)
PLATELET # BLD AUTO: 149 10(3)UL (ref 150–450)
POTASSIUM SERPL-SCNC: 3.9 MMOL/L (ref 3.5–5.1)
POTASSIUM SERPL-SCNC: 5.8 MMOL/L (ref 3.5–5.1)
RBC # BLD AUTO: 4.19 X10(6)UL
SODIUM SERPL-SCNC: 133 MMOL/L (ref 136–145)
SODIUM SERPL-SCNC: 137 MMOL/L (ref 136–145)
WBC # BLD AUTO: 23.1 X10(3) UL (ref 4–11)

## 2021-02-12 PROCEDURE — 99232 SBSQ HOSP IP/OBS MODERATE 35: CPT | Performed by: HOSPITALIST

## 2021-02-12 PROCEDURE — 99291 CRITICAL CARE FIRST HOUR: CPT | Performed by: INTERNAL MEDICINE

## 2021-02-12 PROCEDURE — 99232 SBSQ HOSP IP/OBS MODERATE 35: CPT | Performed by: INTERNAL MEDICINE

## 2021-02-12 RX ORDER — ACETAMINOPHEN 500 MG
500 TABLET ORAL EVERY 6 HOURS PRN
Status: DISCONTINUED | OUTPATIENT
Start: 2021-02-12 | End: 2021-02-17

## 2021-02-12 RX ORDER — ACETAMINOPHEN 10 MG/ML
1000 INJECTION, SOLUTION INTRAVENOUS ONCE
Status: COMPLETED | OUTPATIENT
Start: 2021-02-12 | End: 2021-02-12

## 2021-02-12 RX ORDER — ACETAMINOPHEN 500 MG
1000 TABLET ORAL EVERY 6 HOURS PRN
Status: DISCONTINUED | OUTPATIENT
Start: 2021-02-12 | End: 2021-02-17

## 2021-02-12 RX ORDER — SODIUM POLYSTYRENE SULFONATE 15 G/60ML
15 SUSPENSION ORAL; RECTAL ONCE
Status: COMPLETED | OUTPATIENT
Start: 2021-02-12 | End: 2021-02-12

## 2021-02-12 NOTE — PROGRESS NOTES
Seen and examined. Reviewed with nursing staff at bedside.     Up in chair - apparently having some trouble swallowing    Tele atrial fibrillation - controlled rates    Afebrile  Hypertensive this am after normal pressures overnight    Lungs clear anteriorl

## 2021-02-12 NOTE — PLAN OF CARE
Received patient at 0730 sitting up in chair. Coughing after swallowing occasionally. Speech therapy consult. Ngozi and cordis removed. Up in hallway with PT/OT. Dayron dark nazia urine. Complaints of vertigo with .   One time dose IV tyleno support as indicated  - Manage/alleviate anxiety  - Monitor for signs/symptoms of CO2 retention  Outcome: Progressing     Problem: METABOLIC/FLUID AND ELECTROLYTES - ADULT  Goal: Glucose maintained within prescribed range  Description: INTERVENTIONS:  - Mo

## 2021-02-12 NOTE — PROGRESS NOTES
[facility]  Nephrology Progress Note    Gregorio Sheppard Attending:  Bethany Kent, DO       Assessment and Plan:    1) HUSSAIN- ATN due to contrast nephropathy (CTA / cardiac cath) + post-op fluid shifts / lower BPs superimposed on diabetic nephropathy 37.1 02/12/2021    .0 02/12/2021    CREATSERUM 5.55 02/12/2021    BUN 52 02/12/2021     02/12/2021    K 5.8 02/12/2021     02/12/2021    CO2 17.0 02/12/2021     02/12/2021    CA 7.4 02/12/2021    MG 2.4 02/12/2021    PHOS 7.8 02/1 Oral, Daily PRN    •  bisacodyl (DULCOLAX) rectal suppository 10 mg, 10 mg, Rectal, Daily PRN    •  Fleet Enema (FLEET) 7-19 GM/118ML enema 133 mL, 1 enema, Rectal, Once PRN    •  ondansetron HCl (ZOFRAN) injection 4 mg, 4 mg, Intravenous, Q6H PRN    •  DO bedside.           Danae Cartagena  2/12/2021  6:26 AM

## 2021-02-12 NOTE — PLAN OF CARE
Assumed patient care this PM. Patient alert and oriented x4, DONATO. Patient is getting norco for pain control. Patient has weak cough, but has thick secretions. Monitor shows NSR with frequent PACs. Art line in place with no BP issues.  Padgett in place with mi

## 2021-02-12 NOTE — PHYSICAL THERAPY NOTE
PHYSICAL THERAPY EVALUATION - INPATIENT     Room Number: 4694/0761-J  Evaluation Date: 2/12/2021  Type of Evaluation: Initial  Physician Order: PT Eval and Treat    Presenting Problem: 3V CABG 2/10/21  Reason for Therapy: Mobility Dysfunction and Disch OBJECTIVE  Precautions: Sternal;Cardiac;Bed/chair alarm;  needed  Fall Risk: Standard fall risk    WEIGHT BEARING RESTRICTION  Weight Bearing Restriction: None                PAIN ASSESSMENT  Ratin  Location: surgical site  Management Terry Rylie. Pt resting supine in bed upon arrival, agreeable to PT. Pt educated on goals for PT session, sternal precautions. Pt educated in log roll for bed mobility. Pt transferred supine to sit with CGA, manual cues at back and LEs to position at EOB.  Pt r evaluation, patient's clinical presentation is evolving and overall the evaluation complexity is considered moderate. DISCHARGE RECOMMENDATIONS  PT Discharge Recommendations: Home with home health PT    PLAN  PT Treatment Plan: Bed mobility; Body mechani

## 2021-02-12 NOTE — PROGRESS NOTES
[facility]  Progress Note     Arthur Huffman Patient Status:  Inpatient    3/26/1955 MRN [id number]   [facility] 8NE-A Attending Stephanie Cruz MD   Hosp Day # 8 PCP Nirav Arrington MD     Chief Complaint: afib    S: Patient s 8.4 g Oral Daily   • insulin detemir  12 Units Subcutaneous [email or web address]   • Insulin Aspart Pen  1-68 Units Subcutaneous TID CC   • Insulin Aspart Pen  1-10 Units Subcutaneous TID CC   • Amiodarone HCl  400 mg Oral TID   • metoprolol tartrate  12.5 mg Ora

## 2021-02-12 NOTE — PROGRESS NOTES
Shai Hall Patient Status:  Inpatient    3/26/1955 MRN [id number]   [facility] 6NE-A Attending Catherine Velez, [address] [facility] Day # 8 PCP Faviola Fernandez MD       SUBJECTIVE:    Notes feeling ok, appetite poor   Cr up to 5.5 today Oral, Q15 Min PRN    Or    •  Glucose-Vitamin C (DEX-4) chewable tab 4 tablet, 4 tablet, Oral, Q15 Min PRN    Or    •  dextrose 50 % injection 50 mL, 50 mL, Intravenous, Q15 Min PRN    Or    •  glucose (DEX4) oral liquid 30 g, 30 g, Oral, Q15 Min PRN    Or Intravenous, Continuous PRN    •  norepinephrine (LEVOPHED) 4 mg/250 ml premix infusion, 0.5-30 mcg/min, Intravenous, Continuous PRN    •  Nitroprusside Sodium (NIPRIDE) 50 mg in dextrose 5 % 250 mL infusion, 0.1-4 mcg/kg/min (Dosing Weight), Intravenous,

## 2021-02-12 NOTE — PLAN OF CARE
Assumed care this morning. Pt just extubated this morning. NSR on monitor, BP supported with levo. Able to wean off levo throughout morning. Sternum stable.  +rub. Orders to remove swan and chest tubes. Done without difficultly.   Lungs clear however pt output  - Evaluate effectiveness of vasoactive medications to optimize hemodynamic stability  - Monitor arterial and/or venous puncture sites for bleeding and/or hematoma  - Assess quality of pulses, skin color and temperature  - Assess for signs of decrea Instruct patient on self management of diabetes  Outcome: Progressing  Goal: Electrolytes maintained within normal limits  Description: INTERVENTIONS:  - Monitor labs and rhythm and assess patient for signs and symptoms of electrolyte imbalances  - Adminis

## 2021-02-12 NOTE — OCCUPATIONAL THERAPY NOTE
OCCUPATIONAL THERAPY EVALUATION - INPATIENT     Room Number: 5725/6627-W  Evaluation Date: 2/12/2021  Type of Evaluation: Initial  Presenting Problem: CABG 2/9, HUSSAIN    Physician Order: IP Consult to Occupational Therapy  Reason for Therapy: ADL/IADL Dysfun retired             Prior Level of Function: Independent. Living at home with wife. Retired. SUBJECTIVE   Pt reports Hx of vertigo and that it has returned since after the surgery. Reports 5/10 pain in incision site.     OBJECTIVE  Precautions: Sternal;C Score (AM-PAC Scale): 42.03  CMS Modifier (G-Code): CJ    FUNCTIONAL TRANSFER ASSESSMENT          Skilled Therapy Provided: Pt was approached for therapy lying in bed. Pt primary language is Tagalog. Use of  via video call throughout session.  Pt address the above deficits, maximizing patient’s ability to return safely to his prior level of function.     Patient Complexity  Occupational Profile/Medical History LOW - Brief history including review of medical or therapy records    Specific performance

## 2021-02-12 NOTE — PROGRESS NOTES
[facility]     CV Surgery Progress Note    Emil Colunga Patient Status:  Inpatient    3/26/1955 MRN [id number]   [facility] 6NE-A Attending Laura Santos, DO   Hosp Day # 8 PCP Carolyn Mock MD     Subjective:  Spoke with pat [place]-Bren catheter has been removed. The right IJ vascular sheath remains. The endotracheal tube and nasogastric tube are no longer visualized. The mediastinal drain and chest tube have been removed. No pneumothorax.  Heart and pulmonary vessels appear

## 2021-02-12 NOTE — ADDENDUM NOTE
Addendum  created 02/12/21 1522 by Joo Coreas MD    Clinical Note Signed, Intraprocedure Blocks edited

## 2021-02-12 NOTE — SLP NOTE
ADULT SWALLOWING EVALUATION    ASSESSMENT    ASSESSMENT/OVERALL IMPRESSION:  Patient seen for swallowing evaluation as he was noted by staff to cough after swallowing this morning. Patient admitted due to afib.   On 2/10, he underwent CABGx3, TALISHA amputatio sips  Aspiration Precautions: Upright position; Slow rate;Small bites and sips; No straw  Medication Administration Recommendations: One pill at a time; Whole in puree  Treatment Plan/Recommendations: Dysphagia therapy  Discharge Recommendations/Plan: Daron Taylor numerous support tubes. A right IJ vascular sheath remains. 2. Areas of suspected atelectasis appear similar to the prior.              Dictated by (CST): Thiago Blue MD on 2/11/2021 at 3:39 PM       Finalized by (CST): Thiago Blue MD on 2/11/2021 at 3:40 Bethany Mcdaniel MS CCC-SLP  Pager [phone]    Prior to entering room, SLP donned appropriate PPE for Patient level of isolation including surgical mask, gloves, and eyewear. Patient was not masked.

## 2021-02-13 LAB
ANION GAP SERPL CALC-SCNC: 10 MMOL/L (ref 0–18)
BLOOD TYPE BARCODE: 5100
BUN BLD-MCNC: 58 MG/DL (ref 7–18)
BUN/CREAT SERPL: 14.8 (ref 10–20)
CALCIUM BLD-MCNC: 7.6 MG/DL (ref 8.5–10.1)
CHLORIDE SERPL-SCNC: 102 MMOL/L (ref 98–112)
CO2 SERPL-SCNC: 29 MMOL/L (ref 21–32)
CREAT BLD-MCNC: 3.91 MG/DL
DEPRECATED RDW RBC AUTO: 43.4 FL (ref 35.1–46.3)
ERYTHROCYTE [DISTWIDTH] IN BLOOD BY AUTOMATED COUNT: 13.2 % (ref 11–15)
GLUCOSE BLD-MCNC: 111 MG/DL (ref 70–99)
GLUCOSE BLD-MCNC: 154 MG/DL (ref 70–99)
GLUCOSE BLD-MCNC: 155 MG/DL (ref 70–99)
GLUCOSE BLD-MCNC: 173 MG/DL (ref 70–99)
GLUCOSE BLD-MCNC: 173 MG/DL (ref 70–99)
GLUCOSE BLD-MCNC: 221 MG/DL (ref 70–99)
HCT VFR BLD AUTO: 36.8 %
HGB BLD-MCNC: 12 G/DL
INR BLD: 2.02 (ref 0.89–1.11)
MCH RBC QN AUTO: 29.5 PG (ref 26–34)
MCHC RBC AUTO-ENTMCNC: 32.6 G/DL (ref 31–37)
MCV RBC AUTO: 90.4 FL
OSMOLALITY SERPL CALC.SUM OF ELEC: 309 MOSM/KG (ref 275–295)
PLATELET # BLD AUTO: 135 10(3)UL (ref 150–450)
POTASSIUM SERPL-SCNC: 3.3 MMOL/L (ref 3.5–5.1)
PSA SERPL DL<=0.01 NG/ML-MCNC: 23.4 SECONDS (ref 12.4–14.6)
RBC # BLD AUTO: 4.07 X10(6)UL
SODIUM SERPL-SCNC: 141 MMOL/L (ref 136–145)
WBC # BLD AUTO: 14.8 X10(3) UL (ref 4–11)

## 2021-02-13 PROCEDURE — 99233 SBSQ HOSP IP/OBS HIGH 50: CPT | Performed by: INTERNAL MEDICINE

## 2021-02-13 PROCEDURE — 99232 SBSQ HOSP IP/OBS MODERATE 35: CPT | Performed by: INTERNAL MEDICINE

## 2021-02-13 PROCEDURE — 99232 SBSQ HOSP IP/OBS MODERATE 35: CPT | Performed by: HOSPITALIST

## 2021-02-13 RX ORDER — WARFARIN SODIUM 5 MG/1
5 TABLET ORAL ONCE
Status: COMPLETED | OUTPATIENT
Start: 2021-02-13 | End: 2021-02-13

## 2021-02-13 RX ORDER — POTASSIUM CHLORIDE 20 MEQ/1
40 TABLET, EXTENDED RELEASE ORAL ONCE
Status: COMPLETED | OUTPATIENT
Start: 2021-02-13 | End: 2021-02-13

## 2021-02-13 NOTE — PROGRESS NOTES
[facility]  Progress Note     Ronald Gayle Patient Status:  Inpatient    3/26/1955 MRN [id number]   [facility] 8NE-A Attending Catherine Barton MD   Hosp Day # 9 PCP Macie Street MD     Chief Complaint: afib    S: Patient s Imaging: Imaging data reviewed in Epic.     Medications:   • insulin detemir  10 Units Subcutaneous [email or web address]   • Insulin Aspart Pen  1-68 Units Subcutaneous TID CC   • Insulin Aspart Pen  1-10 Units Subcutaneous TID CC   • Amiodarone HCl  400 mg Or

## 2021-02-13 NOTE — PROGRESS NOTES
Polina José Patient Status:  Inpatient    3/26/1955 MRN [id number]   [facility] 6NE-A Attending Eve Nice, DO   Hosp Day # 9 PCP Sarah Taylor MD       SUBJECTIVE:    Glycemic control better, glucose between 111-170 mg/dL t insulin detemir (LEVEMIR) 100 UNIT/ML flextouch 8 Units, 8 Units, Subcutaneous, [email or web address]    •  glucose (DEX4) oral liquid 15 g, 15 g, Oral, Q15 Min PRN    Or    •  Glucose-Vitamin C (DEX-4) chewable tab 4 tablet, 4 tablet, Oral, Q15 Min PRN    Or    • dextrose 5 % 250 mL infusion, 0.1-4 mcg/kg/min (Dosing Weight), Intravenous, Continuous PRN    •  potassium chloride IVPB premix 20 mEq, 20 mEq, Intravenous, PRN    Or    •  potassium chloride IVPB premix 40 mEq, 40 mEq, Intravenous, PRN    •  calcium gluc

## 2021-02-13 NOTE — PROGRESS NOTES
[facility]  Nephrology Progress Note    Michelle Mauricio Attending:  Michelle Rizzo,        Assessment and Plan:    1) HUSSAIN- ATN due to contrast nephropathy (CTA / cardiac cath) + post-op fluid shifts / lower BPs superimposed on diabetic nephropathy 02/13/2021    CREATSERUM 3.91 02/13/2021    BUN 58 02/13/2021     02/13/2021    K 3.3 02/13/2021     02/13/2021    CO2 29.0 02/13/2021     02/13/2021    CA 7.6 02/13/2021    PGLU 155 02/13/2021       Imaging:   All imaging studies reviewe Nightly PRN    •  docusate sodium (COLACE) cap 100 mg, 100 mg, Oral, BID    •  PEG 3350 (MIRALAX) powder packet 17 g, 17 g, Oral, Daily PRN    •  magnesium hydroxide (MILK OF MAGNESIA) 400 MG/5ML suspension 30 mL, 30 mL, Oral, Daily PRN    •  bisacodyl (DU

## 2021-02-13 NOTE — PLAN OF CARE
Assumed patient care at 1900, patient alert and oriented X 4. Patient denies pain. Vitals stable, A. Fib monitored on tele. Sat >94% on RA. Mid sternal incision with steri-strips CDI.   +2 pedal pulses. Padgett draining clear/yellow urine with adequate OP. self management of diabetes  Outcome: Progressing  Goal: Electrolytes maintained within normal limits  Description: INTERVENTIONS:  - Monitor labs and rhythm and assess patient for signs and symptoms of electrolyte imbalances  - Administer electrolyte repl

## 2021-02-13 NOTE — SLP NOTE
SPEECH DAILY NOTE - INPATIENT    ASSESSMENT & PLAN   ASSESSMENT  Pt seen for dysphagia tx to assess tolerance of regular thin diet. Pt vocal quality improved, sounding less hoarse.   Pt reports that it sounds better to him and that there is no pain in the contact Pili Stephen

## 2021-02-13 NOTE — PROGRESS NOTES
[facility]   [facility] Progress Note    Jaziel Orona Patient Status:  Inpatient    3/26/1955 MRN [id number]   [facility] 6NE-A Attending Jennifer Patel, [address] Day # 9 PCP Renu Cross MD     Subjective: Patient seen and exami metoprolol tartrate (LOPRESSOR) partial tablet 12.5 mg, 12.5 mg, Oral, 2x Daily(Beta Blocker)    •  HYDROcodone-acetaminophen (NORCO)  MG per tab 1 tablet, 1 tablet, Oral, Q4H PRN    Or    •  HYDROcodone-acetaminophen (NORCO)  MG per tab 2 tabl Nasal, BID    •  Pantoprazole Sodium (PROTONIX) EC tab 40 mg, 40 mg, Oral, QAM AC    •  aspirin EC EC tab 325 mg, 325 mg, Oral, Daily    •  atorvastatin (LIPITOR) tab 40 mg, 40 mg, Oral, Nightly    •  influenza vaccine (PF) (FLUZONE HD) high dose for 72 yr

## 2021-02-13 NOTE — CARDIAC REHAB
CAD education initiated with pt. Attempted to use [employer]  on [address], waited on hold and then informed me no  was available. Pt seems to understand English pretty well. Phase 2 CR appt made.

## 2021-02-13 NOTE — PROGRESS NOTES
Up in chair - feels well.     Tele - atrial fibrillation    Afebrile  135/60  76 irregular    Lungs decreased BS bases  Ht irregular  abd soft  Ext SCD's  - bilateral UE edema  Neuro intact    Decent urine output - overall volume positive > 6 liters    58/3

## 2021-02-13 NOTE — PLAN OF CARE
Assumed care 0715. Intubated, [place], cordis, a-line, hui, chest tubes. Weaned sedation, DONATO, following intermittent commands. Extubated approx 0930. Very drowsy, confused. Weaning dobs. More alert in the afternoon, but still drowsy.  Started taking po food

## 2021-02-14 LAB
ANION GAP SERPL CALC-SCNC: 7 MMOL/L (ref 0–18)
BUN BLD-MCNC: 45 MG/DL (ref 7–18)
BUN/CREAT SERPL: 19.6 (ref 10–20)
CALCIUM BLD-MCNC: 7.8 MG/DL (ref 8.5–10.1)
CHLORIDE SERPL-SCNC: 106 MMOL/L (ref 98–112)
CO2 SERPL-SCNC: 28 MMOL/L (ref 21–32)
CREAT BLD-MCNC: 2.3 MG/DL
DEPRECATED RDW RBC AUTO: 41.8 FL (ref 35.1–46.3)
ERYTHROCYTE [DISTWIDTH] IN BLOOD BY AUTOMATED COUNT: 12.7 % (ref 11–15)
GLUCOSE BLD-MCNC: 106 MG/DL (ref 70–99)
GLUCOSE BLD-MCNC: 117 MG/DL (ref 70–99)
GLUCOSE BLD-MCNC: 119 MG/DL (ref 70–99)
GLUCOSE BLD-MCNC: 161 MG/DL (ref 70–99)
GLUCOSE BLD-MCNC: 249 MG/DL (ref 70–99)
HCT VFR BLD AUTO: 39.5 %
HGB BLD-MCNC: 12.8 G/DL
INR BLD: 5.7 (ref 0.89–1.11)
MCH RBC QN AUTO: 29.5 PG (ref 26–34)
MCHC RBC AUTO-ENTMCNC: 32.4 G/DL (ref 31–37)
MCV RBC AUTO: 91 FL
OSMOLALITY SERPL CALC.SUM OF ELEC: 305 MOSM/KG (ref 275–295)
PLATELET # BLD AUTO: 165 10(3)UL (ref 150–450)
POTASSIUM SERPL-SCNC: 3.6 MMOL/L (ref 3.5–5.1)
POTASSIUM SERPL-SCNC: 4 MMOL/L (ref 3.5–5.1)
PSA SERPL DL<=0.01 NG/ML-MCNC: 52.6 SECONDS (ref 12.4–14.6)
RBC # BLD AUTO: 4.34 X10(6)UL
SODIUM SERPL-SCNC: 141 MMOL/L (ref 136–145)
WBC # BLD AUTO: 12.4 X10(3) UL (ref 4–11)

## 2021-02-14 PROCEDURE — 99232 SBSQ HOSP IP/OBS MODERATE 35: CPT | Performed by: HOSPITALIST

## 2021-02-14 PROCEDURE — 99233 SBSQ HOSP IP/OBS HIGH 50: CPT | Performed by: INTERNAL MEDICINE

## 2021-02-14 PROCEDURE — 99232 SBSQ HOSP IP/OBS MODERATE 35: CPT | Performed by: INTERNAL MEDICINE

## 2021-02-14 RX ORDER — POTASSIUM CHLORIDE 1.5 G/1.77G
40 POWDER, FOR SOLUTION ORAL ONCE
Status: COMPLETED | OUTPATIENT
Start: 2021-02-14 | End: 2021-02-14

## 2021-02-14 RX ORDER — FUROSEMIDE 10 MG/ML
20 INJECTION INTRAMUSCULAR; INTRAVENOUS
Status: DISCONTINUED | OUTPATIENT
Start: 2021-02-14 | End: 2021-02-15

## 2021-02-14 NOTE — PLAN OF CARE
Assumed care 0715. A&O x4, DONATO, following commands. Denies pain. Up ambulating with little difficulty. FMLA paperwork complete, given to spouse. See flow sheet for detailed vitals and assessments. Called report to [employer]. Transferred to room 0228.

## 2021-02-14 NOTE — PHYSICAL THERAPY NOTE
PHYSICAL THERAPY TREATMENT NOTE - INPATIENT    Room Number: 0476/3489-A     Session: 1   Number of Visits to Meet Established Goals: 5    Presenting Problem: 3V CABG 2/10/21    History related to current admission: Pt was admitted 2/4 from PCP office due Good           Static Standing: Fair  Dynamic Standing: Fair    ACTIVITY TOLERANCE                        O2 WALK     SPO2% Ambulation on Room Air: 92              AM-PAC '6-Clicks' INPATIENT SHORT FORM - BASIC MOBILITY  How much difficulty does the patien scalpular retraction, trunk twists and chest press     Position Sitting     Repetitions   10   Sets   1     Patient End of Session: Up in chair;Needs met;Call light within reach;RN aware of session/findings; All patient questions and concerns addressed; Fami

## 2021-02-14 NOTE — PROGRESS NOTES
[facility]  Nephrology Progress Note    Marni Roach Attending:  Brett Gilbert,        Assessment and Plan:    1) HUSSAIN- ATN due to contrast nephropathy (CTA / cardiac cath) + post-op fluid shifts / lower BPs- improving with good UO.  Mildly volu 02/14/2021    CA 7.8 02/14/2021    INR 5.70 02/14/2021    PTP 52.6 02/14/2021    PGLU 119 02/14/2021       Imaging: All imaging studies reviewed.     Meds:     •  insulin detemir (LEVEMIR) 100 UNIT/ML flextouch 10 Units, 10 Units, Subcutaneous, [email or web address] Rectal, Daily PRN    •  Fleet Enema (FLEET) 7-19 GM/118ML enema 133 mL, 1 enema, Rectal, Once PRN    •  ondansetron HCl (ZOFRAN) injection 4 mg, 4 mg, Intravenous, Q6H PRN    •  DOBUTamine in D5W (DOBUTREX) 500 mg/250 ml infusion, 2-10 mcg/kg/min (Dosing W

## 2021-02-14 NOTE — PROGRESS NOTES
Seen and examined earlier today. Reviewed overnight course with nursing staff at bedside in CCU.     Feels okay    Remains in atrial fibrillation - asymptomatic    Afebrile  152/105  86 irregular    Lungs decreased BS right base  Ht irregular  abd soft  Ext

## 2021-02-14 NOTE — PROGRESS NOTES
[facility]   [facility] Progress Note    Gregorio Sheppard Patient Status:  Inpatient    3/26/1955 MRN [id number]   [facility] 6NE-A Attending Bethany Kent, [address] [facility] Day # 10 PCP Anna Hodgson MD     Subjective: Patient seen and exam tab 8 tablet, 8 tablet, Oral, Q15 Min PRN    •  amiodarone HCl (PACERONE) tab 400 mg, 400 mg, Oral, TID    •  metoprolol tartrate (LOPRESSOR) partial tablet 12.5 mg, 12.5 mg, Oral, 2x Daily(Beta Blocker)    •  HYDROcodone-acetaminophen (NORCO)  MG pe g, 2 g, Intravenous, PRN    •  mupirocin (BACTROBAN) 2% nasal ointment OINT 1 Application, 1 Application, Nasal, BID    •  Pantoprazole Sodium (PROTONIX) EC tab 40 mg, 40 mg, Oral, QAM AC    •  aspirin EC EC tab 325 mg, 325 mg, Oral, Daily    •  atorvastat transfer to CTU today from CV standpoint      D/W Dr. Chato Nolasco  2/14/2021  10:28 AM

## 2021-02-14 NOTE — PROGRESS NOTES
[facility]  Progress Note     Roro Cisse Patient Status:  Inpatient    3/26/1955 MRN [id number]   [facility] 8NE-A Attending Davide Bui MD   Hosp Day # 10 PCP Merle Mancilla MD     Chief Complaint: afib    S: Patient No results for input(s): TROP, CK in the last 168 hours. Imaging: Imaging data reviewed in Epic.     Medications:   • furosemide  20 mg Intravenous BID (Diuretic)   • insulin detemir  8 Units Subcutaneous [email or web address]   • Insulin Aspart Pen

## 2021-02-14 NOTE — PROGRESS NOTES
Brigette Vázquez Patient Status:  Inpatient    3/26/1955 MRN [id number]   [facility] 6NE-A Attending Luisa Ibarra, DO   Hosp Day # 10 PCP Phoebe Weiss MD       SUBJECTIVE:    Glycemic control good for most checks     Cr better (TYLENOL EXTRA STRENGTH) tab 500 mg, 500 mg, Oral, Q6H PRN    Or    •  acetaminophen (TYLENOL EXTRA STRENGTH) tab 1,000 mg, 1,000 mg, Oral, Q6H PRN    •  glucose (DEX4) oral liquid 15 g, 15 g, Oral, Q15 Min PRN    Or    •  Glucose-Vitamin C (DEX-4) chewabl Continuous PRN    •  Nitroprusside Sodium (NIPRIDE) 50 mg in dextrose 5 % 250 mL infusion, 0.1-4 mcg/kg/min (Dosing Weight), Intravenous, Continuous PRN    •  potassium chloride IVPB premix 20 mEq, 20 mEq, Intravenous, PRN    Or    •  potassium chloride IV

## 2021-02-14 NOTE — PROGRESS NOTES
Assumed care of pt. At [address]. Pt. Up in chair. A & O x4. VSS. Denies of any pain. DONATO. V-wires in place, secured. Ambulating in halls. Tolerating PO intake. Will continue to monitor closely.

## 2021-02-15 LAB
ALBUMIN SERPL-MCNC: 2.7 G/DL (ref 3.4–5)
ALP LIVER SERPL-CCNC: 96 U/L
ALT SERPL-CCNC: 19 U/L
ANION GAP SERPL CALC-SCNC: 7 MMOL/L (ref 0–18)
AST SERPL-CCNC: 39 U/L (ref 15–37)
BILIRUB DIRECT SERPL-MCNC: 0.2 MG/DL (ref 0–0.2)
BILIRUB SERPL-MCNC: 0.8 MG/DL (ref 0.1–2)
BUN BLD-MCNC: 41 MG/DL (ref 7–18)
BUN/CREAT SERPL: 22.7 (ref 10–20)
CALCIUM BLD-MCNC: 8.2 MG/DL (ref 8.5–10.1)
CHLORIDE SERPL-SCNC: 105 MMOL/L (ref 98–112)
CO2 SERPL-SCNC: 27 MMOL/L (ref 21–32)
CREAT BLD-MCNC: 1.81 MG/DL
GLUCOSE BLD-MCNC: 205 MG/DL (ref 70–99)
GLUCOSE BLD-MCNC: 84 MG/DL (ref 70–99)
GLUCOSE BLD-MCNC: 86 MG/DL (ref 70–99)
GLUCOSE BLD-MCNC: 89 MG/DL (ref 70–99)
GLUCOSE BLD-MCNC: 94 MG/DL (ref 70–99)
INR BLD: 3.95 (ref 0.89–1.11)
M PROTEIN MFR SERPL ELPH: 6.7 G/DL (ref 6.4–8.2)
OSMOLALITY SERPL CALC.SUM OF ELEC: 298 MOSM/KG (ref 275–295)
POTASSIUM SERPL-SCNC: 3.9 MMOL/L (ref 3.5–5.1)
PSA SERPL DL<=0.01 NG/ML-MCNC: 39.5 SECONDS (ref 12.4–14.6)
SODIUM SERPL-SCNC: 139 MMOL/L (ref 136–145)

## 2021-02-15 PROCEDURE — 99232 SBSQ HOSP IP/OBS MODERATE 35: CPT | Performed by: INTERNAL MEDICINE

## 2021-02-15 PROCEDURE — 99233 SBSQ HOSP IP/OBS HIGH 50: CPT | Performed by: INTERNAL MEDICINE

## 2021-02-15 PROCEDURE — 99232 SBSQ HOSP IP/OBS MODERATE 35: CPT | Performed by: HOSPITALIST

## 2021-02-15 RX ORDER — AMIODARONE HYDROCHLORIDE 200 MG/1
400 TABLET ORAL DAILY
Status: DISCONTINUED | OUTPATIENT
Start: 2021-02-16 | End: 2021-02-17

## 2021-02-15 RX ORDER — FUROSEMIDE 10 MG/ML
40 INJECTION INTRAMUSCULAR; INTRAVENOUS
Status: DISCONTINUED | OUTPATIENT
Start: 2021-02-15 | End: 2021-02-17

## 2021-02-15 RX ORDER — POTASSIUM CHLORIDE 20 MEQ/1
40 TABLET, EXTENDED RELEASE ORAL ONCE
Status: COMPLETED | OUTPATIENT
Start: 2021-02-15 | End: 2021-02-15

## 2021-02-15 NOTE — CM/SW NOTE
Before surgery, order [facility] per [facility] $35.oo--spoke with joe JULES who will check with dr Johnella Libman if potential medication @ discharge or if script should be d/c'ed if patient to remain on couma

## 2021-02-15 NOTE — PROGRESS NOTES
Pt up in chair since arrived from CCU. Ambulated in hallway three  Times gait steady. Patient denies any pain. Incentive spirometer 750 pt needs encouragement to use. Grant moore and SCD's on.

## 2021-02-15 NOTE — PLAN OF CARE
Patient alert and oriented x4. Vital signs stable, Aflutter on telemetry, on room air. Achieves 750 on IS. Mid-sternal incision with steri-strips painted with betadine, clean dry and intact. L leg incision steri-strips clean dry and intact.  Patient denies fluid balance, assess for edema, trend weights  Outcome: Progressing  Goal: Absence of cardiac arrhythmias or at baseline  Description: INTERVENTIONS:  - Continuous cardiac monitoring, monitor vital signs, obtain 12 lead EKG if indicated  - Evaluate effect electrolyte replacements, including rhythm and repeat lab results as appropriate  - Fluid restriction as ordered  - Instruct patient on fluid and nutrition restrictions as appropriate  Outcome: Progressing  Goal: Hemodynamic stability and optimal renal fun

## 2021-02-15 NOTE — SLP NOTE
SPEECH DAILY NOTE - INPATIENT    ASSESSMENT & PLAN   ASSESSMENT  Pt seen for dysphagia tx to assess tolerance with recommended diet, ensure proper utilization of aspiration precautions and provide pt/family education.   Patient alert and up in bed, voice is CCC-SLP  Pager [phone]    Prior to entering room, SLP donned appropriate PPE for Patient level of isolation including surgical mask, gloves, and eyewear. Patient was not masked.

## 2021-02-15 NOTE — PROGRESS NOTES
[facility]  Nephrology Progress Note    Jevon Ulloa Attending:  Quinten Benito, DO       Assessment and Plan:    1) HUSSAIN- ATN due to contrast nephropathy (CTA / cardiac cath) + post-op fluid shifts / lower BPs- improving with good UO.  Continue IV 6.7 02/15/2021    AST 39 02/15/2021    ALT 19 02/15/2021    INR 3.95 02/15/2021    PTP 39.5 02/15/2021    PGLU 86 02/15/2021       Imaging: All imaging studies reviewed.     Meds:     •  [START ON 2/16/2021] insulin detemir (LEVEMIR) 100 UNIT/ML flextouch 400 MG/5ML suspension 30 mL, 30 mL, Oral, Daily PRN    •  bisacodyl (DULCOLAX) rectal suppository 10 mg, 10 mg, Rectal, Daily PRN    •  Fleet Enema (FLEET) 7-19 GM/118ML enema 133 mL, 1 enema, Rectal, Once PRN    •  ondansetron HCl (ZOFRAN) injection 4 mg,

## 2021-02-15 NOTE — PLAN OF CARE
Assumed care of pt at 0730  A&Ox4, primarily [address] speaking but communicates well in [address], up with standby assist, no complaints of pain  R/A, A flutter with controlled rates, no chest pain, no shortness of breath  Sternum and L Leg incision (x1) all bleeding and/or hematoma  - Assess quality of pulses, skin color and temperature  - Assess for signs of decreased coronary artery perfusion - ex.  Angina  - Evaluate fluid balance, assess for edema, trend weights  Outcome: Progressing  Goal: Absence of card INTERVENTIONS:  - Monitor labs and rhythm and assess patient for signs and symptoms of electrolyte imbalances  - Administer electrolyte replacement as ordered  - Monitor response to electrolyte replacements, including rhythm and repeat lab results as appro

## 2021-02-15 NOTE — PROGRESS NOTES
[facility]  Progress Note     Ronald Gayle Patient Status:  Inpatient    3/26/1955 MRN [id number]   [facility] 8NE-A Attending Catherine Barton MD   Hosp Day # 6 PCP Macie Street MD     Chief Complaint: afib    S: Patient Labs   Lab 02/13/21  0834 02/14/21  0522 02/15/21  0505   PTP 23.4* 52.6* 39.5*   INR 2.02* 5.70* 3.95*       No results for input(s): TROP, CK in the last 168 hours. Imaging: Imaging data reviewed in Epic.     Medications:   • [START ON 2/16/2021]

## 2021-02-15 NOTE — PROGRESS NOTES
Kurtis Paredes Patient Status:  Inpatient    3/26/1955 MRN [id number]   [facility] 6NE-A Attending Erika Castellanos, [address] [facility] Day # 6 PCP Diana Hernandez MD       SUBJECTIVE:    Glycemic control good for most checks     Cr better Units, 1-10 Units, Subcutaneous, TID CC    •  acetaminophen (TYLENOL EXTRA STRENGTH) tab 500 mg, 500 mg, Oral, Q6H PRN    Or    •  acetaminophen (TYLENOL EXTRA STRENGTH) tab 1,000 mg, 1,000 mg, Oral, Q6H PRN    •  glucose (DEX4) oral liquid 15 g, 15 g, Ora mg/250 ml premix infusion, 0.5-30 mcg/min, Intravenous, Continuous PRN    •  Nitroprusside Sodium (NIPRIDE) 50 mg in dextrose 5 % 250 mL infusion, 0.1-4 mcg/kg/min (Dosing Weight), Intravenous, Continuous PRN    •  potassium chloride IVPB premix 20 mEq, 20

## 2021-02-15 NOTE — HOME CARE LIAISON
Received referral from [facility]. Met with patient at the bedside and provided choice. Patient is agreeable to [facility]. Residential brochure provided with contact information. All questions addressed and answered.

## 2021-02-15 NOTE — CARDIAC REHAB
CAD and CHF teaching complete with Pt. Pt shown going home video. Binder at bedside appointment already made for CR. Pt did not want  wanted teaching in [address]

## 2021-02-15 NOTE — CM/SW NOTE
Care Progression Note:  Active Acute Medical Issue:   Atrial fibrillation with RVR ([address]     Other Contributing Medical Factors/Dx. : s/p POD #5 CABG x 3 LIMA to LAD, SVG to OM, SVG to diag, RCA heavily calcified not bypassed, TALISHA    Length of stay: 11  GML

## 2021-02-15 NOTE — PROGRESS NOTES
[facility]     CV Surgery Progress Note    [facility] Patient Status:  Inpatient    3/26/1955 MRN [id number]   [facility] 8NE-A Attending Alicia Huerta, [address] [facility] Day # 6 PCP Rafita Grullon MD     Subjective:  Spoke with pa continues to improve. Nephrology following, managing diuresis   -Post op anemia - stable, monitor   -Leukocytosis - wbc trending down, afebrile. Repeat CBC tomorrow   -Pain management - norco prn   -CT removed.  PW still in place - will discuss plan with

## 2021-02-15 NOTE — PROGRESS NOTES
[facility] Cardiology  Progress Note    Harshad Barahona Patient Status:  Inpatient    3/26/1955 MRN [id number]   [facility] 8NE-A Attending Lori Hudson, [address] [facility] Day # 11 PCP Jamshid Marion MD     Subjective: mild inc strength, no joint effusion. Integumentary: Warm and dry skin. No rashes. Psychiatric: no depression. Normal affect.      Laboratory/Data:    Labs:       Recent Labs   Lab 02/10/21  1632 02/10/21  1725 02/11/21  0403 02/12/21  1327 02/13/21  0509 02/13/2    CONCLUSION:  Unremarkable sonographic appearance of the kidneys.     Dictated by (CST): Harjit Perez MD on 2/07/2021 at 4:50 PM     Finalized by (CST): Harjit Perez MD on 2/07/2021 at 4:51 PM        Xr Chest Ap Portable  (cpt=71045)     Resul Finalized by (CST): Brei Alvarado MD on 2/09/2021 at 1:12 PM        Cta Gated Thoracic Aorta (cpt=71275)     Result Date: 2/9/2021  PROCEDURE:  CTA GATED THORACIC AORTA (CPT=71275) Once DATE:    2/9/21 COMPARISON:     CT      SUMMARY:  Severe, three vess (NORCO)  MG per tab 1 tablet, 1 tablet, Oral, Q4H PRN    Or    •  HYDROcodone-acetaminophen (NORCO)  MG per tab 2 tablet, 2 tablet, Oral, Q4H PRN    •  morphINE sulfate (PF) 2 MG/ML injection 2 mg, 2 mg, Intravenous, Q2H PRN    Or    •  morphIN 0.7ml, 0.7 mL, Intramuscular, Prior to discharge        Allergies:  No Known Allergies    Impression:  1.   Day #5 - s/p 3 v CABG (LIMA to LAD, SVG to OM and SVG to Diag), diffusely diseased and heavily calcified RCA could not be bypassed, also pulmonary ve metoprolol tartrate from 12.5 mg PO BID to 25 mg po TID today and change to long-acting daily prior to discharge  -lower amiodarone to 400 mg po daily -in A. fib/a flutter with our variable AV conduction and being loaded over last 5 days  -hold on ARB till

## 2021-02-16 LAB
ANION GAP SERPL CALC-SCNC: 6 MMOL/L (ref 0–18)
ATRIAL RATE: 326 BPM
BUN BLD-MCNC: 38 MG/DL (ref 7–18)
BUN/CREAT SERPL: 20.4 (ref 10–20)
CALCIUM BLD-MCNC: 7.8 MG/DL (ref 8.5–10.1)
CHLORIDE SERPL-SCNC: 105 MMOL/L (ref 98–112)
CO2 SERPL-SCNC: 26 MMOL/L (ref 21–32)
CREAT BLD-MCNC: 1.86 MG/DL
DEPRECATED RDW RBC AUTO: 39.8 FL (ref 35.1–46.3)
ERYTHROCYTE [DISTWIDTH] IN BLOOD BY AUTOMATED COUNT: 12.6 % (ref 11–15)
GLUCOSE BLD-MCNC: 124 MG/DL (ref 70–99)
GLUCOSE BLD-MCNC: 139 MG/DL (ref 70–99)
GLUCOSE BLD-MCNC: 200 MG/DL (ref 70–99)
GLUCOSE BLD-MCNC: 66 MG/DL (ref 70–99)
GLUCOSE BLD-MCNC: 81 MG/DL (ref 70–99)
GLUCOSE BLD-MCNC: 82 MG/DL (ref 70–99)
GLUCOSE BLD-MCNC: 86 MG/DL (ref 70–99)
HCT VFR BLD AUTO: 39.4 %
HGB BLD-MCNC: 13.4 G/DL
INR BLD: 2.68 (ref 0.89–1.11)
MCH RBC QN AUTO: 29.9 PG (ref 26–34)
MCHC RBC AUTO-ENTMCNC: 34 G/DL (ref 31–37)
MCV RBC AUTO: 87.9 FL
OSMOLALITY SERPL CALC.SUM OF ELEC: 299 MOSM/KG (ref 275–295)
P AXIS: 35 DEGREES
PLATELET # BLD AUTO: 235 10(3)UL (ref 150–450)
POTASSIUM SERPL-SCNC: 4.6 MMOL/L (ref 3.5–5.1)
PSA SERPL DL<=0.01 NG/ML-MCNC: 29.2 SECONDS (ref 12.4–14.6)
Q-T INTERVAL: 422 MS
QRS DURATION: 74 MS
QTC CALCULATION (BEZET): 477 MS
R AXIS: 26 DEGREES
RBC # BLD AUTO: 4.48 X10(6)UL
SODIUM SERPL-SCNC: 137 MMOL/L (ref 136–145)
T AXIS: 44 DEGREES
VENTRICULAR RATE: 77 BPM
WBC # BLD AUTO: 11.9 X10(3) UL (ref 4–11)

## 2021-02-16 PROCEDURE — 99232 SBSQ HOSP IP/OBS MODERATE 35: CPT | Performed by: INTERNAL MEDICINE

## 2021-02-16 PROCEDURE — 99232 SBSQ HOSP IP/OBS MODERATE 35: CPT | Performed by: HOSPITALIST

## 2021-02-16 NOTE — PROGRESS NOTES
Bárbara Swati Patient Status:  Inpatient    3/26/1955 MRN [id number]   [facility] 6NE-A Attending Jania Valdez, DO   Hosp Day # 15 PCP Telma Cunningham MD       SUBJECTIVE:    Glycemic control good for most checks, though on the lo Oral, Daily    •  metoprolol Tartrate (LOPRESSOR) tab 25 mg, 25 mg, Oral, TID Beta Blocker/Cardiac    •  Insulin Aspart Pen (NOVOLOG) 100 UNIT/ML flexpen 1-68 Units, 1-68 Units, Subcutaneous, TID CC    •  acetaminophen (TYLENOL EXTRA STRENGTH) tab 500 mg, units Q am   Linagliptin 5 mg every morning   Novolog 1 unit for every 30 mg/dL above 140 mg/dL before meals   Check fingerstick glucose before meals and at bedtime   Hypoglycemia protocol in place     Since will need to avoid metformin and jardiance for n

## 2021-02-16 NOTE — PROGRESS NOTES
[facility]   CV Surgery Progress Note    Arthur Huffman Patient Status:  Inpatient    3/26/1955 MRN [id number]   [facility] 8NE-A Attending Jaison Brown, [address] [facility] Day # 12 PCP Nirav Arrington MD     Subjective:  Reports feeling to Eliquis rather than coumadin    -Ischemic cardiomyopathy - LVEF 40-45%. Cardiology managing   -HUSSAIN on CKD, volume overload - Cr continues to improve.  Nephrology following, managing diuresis   -Post op anemia - stable, monitor   -Leukocytosis - wbc trend

## 2021-02-16 NOTE — OCCUPATIONAL THERAPY NOTE
OCCUPATIONAL THERAPY TREATMENT NOTE - INPATIENT     Room Number: 4629/9545-G  Session: 1   Number of Visits to Meet Established Goals: 5    Presenting Problem: CABG 2/9, HUSSAIN    History related to current admission: Pt was admitted 2/4 from PCP office due t 0  Location: denies        ACTIVITY TOLERANCE                         O2 SATURATIONS                ACTIVITIES OF DAILY LIVING ASSESSMENT  AM-PAC ‘6-Clicks’ Inpatient Daily Activity Short Form  How much help from another person does the patient currently n as well as therapeutic exercise and education. Pt continues to progress with and meet all established goals. Pt no longer presents with skilled IP OT needs. OT will sign off at this time and recommend pt discharge to home when medically stable.      OT Disc

## 2021-02-16 NOTE — PLAN OF CARE
Assumed care of pt at 0730  A&Ox4, primarily Ramin Quinn 1060 speaking but communicates well in English, no complaints of pain, up with standby assist  R/A, A flutter with controlled rates, no chest pain, no shortness of breath  Sternal incision and L leg incision interventions  Outcome: Progressing     Problem: CARDIOVASCULAR - ADULT  Goal: Maintains optimal cardiac output and hemodynamic stability  Description: INTERVENTIONS:  - Monitor vital signs, rhythm, and trends  - Monitor for bleeding, hypotension and signs INTERVENTIONS:  - Monitor Blood Glucose as ordered  - Assess for signs and symptoms of hyperglycemia and hypoglycemia  - Administer ordered medications to maintain glucose within target range  - Assess barriers to adequate nutritional intake and initiate n

## 2021-02-16 NOTE — PROGRESS NOTES
[facility]  Nephrology Progress Note    Brigette Vázquez Attending:  Luisa Ibarra, DO       Assessment and Plan:    1) HUSSAIN- ATN due to contrast nephropathy (CTA / cardiac cath) + post-op fluid shifts / lower BPs- improving with good UO.      2) CKD 02/16/2021     02/16/2021    CA 7.8 02/16/2021    INR 2.68 02/16/2021    PTP 29.2 02/16/2021    PGLU 82 02/16/2021       Imaging: All imaging studies reviewed.     Meds:     •  Insulin Aspart Pen (NOVOLOG) 100 UNIT/ML flexpen 1-10 Units, 1-10 Units, PRN    •  Pantoprazole Sodium (PROTONIX) EC tab 40 mg, 40 mg, Oral, QAM AC    •  atorvastatin (LIPITOR) tab 40 mg, 40 mg, Oral, Nightly    •  influenza vaccine (PF) (FLUZONE HD) high dose for 65 yrs & older inj 0.7ml, 0.7 mL, Intramuscular, Prior to discha

## 2021-02-16 NOTE — PLAN OF CARE
Alert. Oriented. Aflutter per tele. Hr 70s. Denies pain. Norco and melatonin given tonight. Ambulated in halls last night. Tolerated well. Pod#6 s/p CABG. Mid sternal incision w/ ss and page. Lt thigh donor site x1 w/ ss and page. Had bm yesteday.  Bs present

## 2021-02-16 NOTE — PROGRESS NOTES
[facility]  Progress Note     More Boone Patient Status:  Inpatient    3/26/1955 MRN [id number]   [facility] 8NE-A Attending Lainey Collins MD   Hosp Day # 15 PCP Brock Pham MD     Chief Complaint: afib    S: Patient mg/dL (H)). Recent Labs   Lab 02/14/21  0522 02/15/21  0505 02/16/21  0612   PTP 52.6* 39.5* 29.2*   INR 5.70* 3.95* 2.68*       No results for input(s): TROP, CK in the last 168 hours. Imaging: Imaging data reviewed in Epic.     Medications:   •

## 2021-02-16 NOTE — PROGRESS NOTES
[facility]  Cardiology Progress Note    Poseyvillewalter So Patient Status:  Inpatient    3/26/1955 MRN [id number]   [facility] 8NE-A Attending Eloy Houston DO   Hosp Day # 12 PCP Laura Woods MD       Subjective:  Up walking in Medications:    • Insulin Aspart Pen  1-10 Units Subcutaneous TID CC   • insulin detemir  14 Units Subcutaneous Daily   • furosemide  40 mg Intravenous BID (Diuretic)   • Amiodarone HCl  400 mg Oral Daily   • metoprolol tartrate  25 mg Oral TID B for HTN, HL, found to have multivessel CAD s/p CABG.     CABG:  -LIMA to LAD, SVG to OM, SVG diagonal.  RCA was unable to be bypassed  -Underwent pulmonary vein ablation and TALISHA amputation  -Postoperative course complicated by new onset AF  -Continue IV diu

## 2021-02-17 VITALS
TEMPERATURE: 98 F | DIASTOLIC BLOOD PRESSURE: 77 MMHG | BODY MASS INDEX: 30 KG/M2 | SYSTOLIC BLOOD PRESSURE: 121 MMHG | HEART RATE: 90 BPM | RESPIRATION RATE: 18 BRPM | OXYGEN SATURATION: 95 % | WEIGHT: 196 LBS

## 2021-02-17 LAB
ANION GAP SERPL CALC-SCNC: 6 MMOL/L (ref 0–18)
BUN BLD-MCNC: 42 MG/DL (ref 7–18)
BUN/CREAT SERPL: 26.4 (ref 10–20)
CALCIUM BLD-MCNC: 8.1 MG/DL (ref 8.5–10.1)
CHLORIDE SERPL-SCNC: 106 MMOL/L (ref 98–112)
CO2 SERPL-SCNC: 28 MMOL/L (ref 21–32)
CREAT BLD-MCNC: 1.59 MG/DL
DEPRECATED RDW RBC AUTO: 40.7 FL (ref 35.1–46.3)
ERYTHROCYTE [DISTWIDTH] IN BLOOD BY AUTOMATED COUNT: 12.5 % (ref 11–15)
GLUCOSE BLD-MCNC: 86 MG/DL (ref 70–99)
GLUCOSE BLD-MCNC: 89 MG/DL (ref 70–99)
HCT VFR BLD AUTO: 40.2 %
HGB BLD-MCNC: 13.2 G/DL
INR BLD: 2.22 (ref 0.89–1.11)
MCH RBC QN AUTO: 29.5 PG (ref 26–34)
MCHC RBC AUTO-ENTMCNC: 32.8 G/DL (ref 31–37)
MCV RBC AUTO: 89.7 FL
OSMOLALITY SERPL CALC.SUM OF ELEC: 300 MOSM/KG (ref 275–295)
PLATELET # BLD AUTO: 268 10(3)UL (ref 150–450)
POTASSIUM SERPL-SCNC: 4.3 MMOL/L (ref 3.5–5.1)
PSA SERPL DL<=0.01 NG/ML-MCNC: 25.2 SECONDS (ref 12.4–14.6)
RBC # BLD AUTO: 4.48 X10(6)UL
SODIUM SERPL-SCNC: 140 MMOL/L (ref 136–145)
WBC # BLD AUTO: 11.9 X10(3) UL (ref 4–11)

## 2021-02-17 PROCEDURE — 99233 SBSQ HOSP IP/OBS HIGH 50: CPT | Performed by: NURSE PRACTITIONER

## 2021-02-17 PROCEDURE — 99239 HOSP IP/OBS DSCHRG MGMT >30: CPT | Performed by: HOSPITALIST

## 2021-02-17 PROCEDURE — 99232 SBSQ HOSP IP/OBS MODERATE 35: CPT | Performed by: INTERNAL MEDICINE

## 2021-02-17 RX ORDER — METOPROLOL SUCCINATE 50 MG/1
50 TABLET, EXTENDED RELEASE ORAL
Status: DISCONTINUED | OUTPATIENT
Start: 2021-02-18 | End: 2021-02-17

## 2021-02-17 RX ORDER — ATORVASTATIN CALCIUM 40 MG/1
40 TABLET, FILM COATED ORAL NIGHTLY
Qty: 30 TABLET | Refills: 3 | Status: SHIPPED | OUTPATIENT
Start: 2021-02-17

## 2021-02-17 RX ORDER — LANCETS 28 GAUGE
1 EACH MISCELLANEOUS DAILY
Qty: 100 EACH | Refills: 0 | Status: SHIPPED | OUTPATIENT
Start: 2021-02-17

## 2021-02-17 RX ORDER — AMIODARONE HYDROCHLORIDE 200 MG/1
200 TABLET ORAL DAILY
Qty: 30 TABLET | Refills: 3 | Status: SHIPPED | OUTPATIENT
Start: 2021-02-18

## 2021-02-17 RX ORDER — PEN NEEDLE, DIABETIC 30 GX3/16"
1 NEEDLE, DISPOSABLE MISCELLANEOUS DAILY
Qty: 50 EACH | Refills: 1 | Status: SHIPPED | OUTPATIENT
Start: 2021-02-17 | End: 2021-03-12

## 2021-02-17 RX ORDER — HYDROCODONE BITARTRATE AND ACETAMINOPHEN 5; 325 MG/1; MG/1
1-2 TABLET ORAL EVERY 6 HOURS PRN
Qty: 50 TABLET | Refills: 0 | Status: SHIPPED | OUTPATIENT
Start: 2021-02-17

## 2021-02-17 RX ORDER — AMIODARONE HYDROCHLORIDE 200 MG/1
200 TABLET ORAL DAILY
Status: DISCONTINUED | OUTPATIENT
Start: 2021-02-18 | End: 2021-02-17

## 2021-02-17 RX ORDER — METOPROLOL SUCCINATE 50 MG/1
50 TABLET, EXTENDED RELEASE ORAL
Qty: 30 TABLET | Refills: 3 | Status: SHIPPED | OUTPATIENT
Start: 2021-02-18 | End: 2021-06-17

## 2021-02-17 NOTE — PROGRESS NOTES
[facility]  Nephrology Progress Note    Joseph Toribio Attending:  Jim Moreau DO       Assessment and Plan:    1) HUSSAIN- ATN due to contrast nephropathy (CTA / cardiac cath) + post-op fluid shifts / lower BPs- resolved.      2) CKD 3- baseline Cr  02/17/2021    CO2 28.0 02/17/2021    GLU 86 02/17/2021    CA 8.1 02/17/2021    INR 2.22 02/17/2021    PTP 25.2 02/17/2021    PGLU 89 02/17/2021       Imaging: All imaging studies reviewed.     Meds:     •  Insulin Aspart Pen (NOVOLOG) 100 UNIT/ML Intravenous, Q6H PRN    •  Pantoprazole Sodium (PROTONIX) EC tab 40 mg, 40 mg, Oral, QAM AC    •  atorvastatin (LIPITOR) tab 40 mg, 40 mg, Oral, Nightly    •  influenza vaccine (PF) (FLUZONE HD) high dose for 65 yrs & older inj 0.7ml, 0.7 mL, Intramuscular

## 2021-02-17 NOTE — PROGRESS NOTES
[facility]   CV Surgery Progress Note    Marco Hand Patient Status:  Inpatient    3/26/1955 MRN [id number]   [facility] 8NE-A Attending Augustine Osborn, [address] [facility] Day # 15 PCP Floyd Acuna MD     Subjective:  Reports feeling when INR lower     -Ischemic cardiomyopathy - LVEF 40-45%. Cardiology managing   -HUSSAIN on CKD - Cr improved to baseline, volume status improved.  Nephrology following, managing diuretics   -Post op anemia - stable, monitor   -Leukocytosis - wbc trending down

## 2021-02-17 NOTE — SLP NOTE
SPEECH DAILY NOTE - INPATIENT    ASSESSMENT & PLAN   ASSESSMENT  Pt seen for dysphagia tx to assess tolerance with recommended diet, ensure proper utilization of aspiration precautions and provide pt/family education.   Patient alert and up in bedside chair will demonstrate understanding and implementation of aspiration precautions and swallow strategies independently over 1-2 session(s).     Met     FOLLOW UP  Follow Up Needed: No  SLP Follow-up Date: 02/16/21  Number of Visits to Meet Established Goals: 1

## 2021-02-17 NOTE — PLAN OF CARE
Assumed care of pt at 0730  A&Ox4, up with standby assist, no complaints of pain  R/A, A flutter, no chest pain, no shortness of breath  Sternal and L leg incision (x1) clean, dry, intact, closed with steri-strips, and painted with betadine, no additional effectiveness of vasoactive medications to optimize hemodynamic stability  - Monitor arterial and/or venous puncture sites for bleeding and/or hematoma  - Assess quality of pulses, skin color and temperature  - Assess for signs of decreased coronary artery management of diabetes  Outcome: Progressing  Goal: Electrolytes maintained within normal limits  Description: INTERVENTIONS:  - Monitor labs and rhythm and assess patient for signs and symptoms of electrolyte imbalances  - Administer electrolyte replaceme

## 2021-02-17 NOTE — PLAN OF CARE
Alert. Oriented. Afluter per tele. Hr 70s. Medicated w/ norco for incisional pain. Relief obtained. Ambulated in halls w/ steady gait. Teds off tonight. Scds in place. Incentive spirometry up to 750. Poc discussed with pt. Voiced understanding. Cont. to mon

## 2021-02-17 NOTE — PROGRESS NOTES
[facility]  Cardiology Progress Note    Ruddygeovany Sonya Patient Status:  Inpatient    3/26/1955 MRN [id number]   [facility] 8NE-A Attending Brittanie Ren, [address] [facility] Day # 15 PCP Jarrell Starkey MD       Subjective:  Up in chair, n CA 8.1 02/17/2021    INR 2.22 02/17/2021           Medications:    • Insulin Aspart Pen  1-10 Units Subcutaneous TID CC   • linagliptin  5 mg Oral Daily with breakfast   • insulin detemir  14 Units Subcutaneous Daily   • furosemide  40 mg Intravenous BID (

## 2021-02-18 ENCOUNTER — PATIENT OUTREACH (OUTPATIENT)
Dept: CASE MANAGEMENT | Age: 66
End: 2021-02-18

## 2021-02-18 DIAGNOSIS — Z02.9 ENCOUNTERS FOR ADMINISTRATIVE PURPOSE: ICD-10-CM

## 2021-02-18 LAB — GLUCOSE BLD-MCNC: 190 MG/DL (ref 70–99)

## 2021-02-18 NOTE — DISCHARGE SUMMARY
[facility]  DISCHARGE SUMMARY     Mliena Garcia Patient Status:  Inpatient    3/26/1955 MRN [id number]   [facility] 8NE-A Attending No att. providers found   Hosp Day # 15 PCP Sandra Martinez MD     Date of Admission:  • CABG    Incidental or significant findings and recommendations (brief descriptions):  • none    Lab/Test results pending at Discharge:   · none    Consultants:  • Cards, endo, CV surgery     Discharge Medication List:     Discharge Medications      STA 5, 2021  Quantity: 90 tablet  Refills: 0        STOP taking these medications    metFORMIN HCl 1000 MG Tabs  Commonly known as: GLUCOPHAGE              Where to Get Your Medications      These medications were sent to [facility] [place], [place] - 100 bowel sounds. No rebound or guarding. Neurologic: No focal neurological deficits. Musculoskeletal: Moves all extremities. Extremities: bl le edema.   -----------------------------------------------------------------------------------------------  hSante Faye

## 2021-02-19 ENCOUNTER — TELEPHONE (OUTPATIENT)
Dept: CARDIOLOGY UNIT | Facility: HOSPITAL | Age: 66
End: 2021-02-19

## 2021-02-19 NOTE — OPERATIVE REPORT
[facility]    PATIENT'S NAME: Diego Kenny   ATTENDING PHYSICIAN: Jin Vasques MD   OPERATING PHYSICIAN: Pawan Polo MD   PATIENT ACCOUNT#:   [id number]    LOCATION:  [address] [place] [address]  MEDICAL RECORD #:   [id number]       DATE OF BIRTH:  03/2 [employer]. Left atrial appendage was amputated. There was no clot in the left atrial appendage. This was oversewn with 2 layers of 3-0 Prolene suture and a light layer of Tridyne glue was applied.   Cardioplegia was given following which the ramus interme

## 2021-02-19 NOTE — PROGRESS NOTES
Follow Up Phone Call    1. How are you doing now that you are home? Slept well. 2. Have there been any changes in your wound/incision since going home? None, discussed showering once a day and removing steri strips on 2/24. 3. Is your pain manageable at home? None. 4. Are you following the walking routine given to you in the hospital? Yes with PT    5. Are you continuing to use your incentive spirometer?     6. Do you have your appointments for     Chest Xray? 2/24                                                               Primary MD? 2/22 LINNEA Knox at 0900 am                                                                Cardiologist?  2/24 Emilee GALLOWAY at 1:30 pm [facility])     Dr. Turner Lainez? 3/10 Lieutenant Moreland at 1130                                                                Cardiac Rehab? 3/18 Cardiac Rehab phase 2    7. Do you have any other questions or concerns today? No further questions. RN spoke with Marli Keith. Call Brigid Sloan with any further questions.       Jazzmine Ceron  2/19/2021  5:37 PM

## 2021-02-23 ENCOUNTER — TELEPHONE (OUTPATIENT)
Dept: FAMILY MEDICINE CLINIC | Facility: CLINIC | Age: 66
End: 2021-02-23

## 2021-02-23 DIAGNOSIS — I25.10 CORONARY ARTERY DISEASE INVOLVING NATIVE CORONARY ARTERY OF NATIVE HEART WITHOUT ANGINA PECTORIS: ICD-10-CM

## 2021-02-23 DIAGNOSIS — I48.91 ATRIAL FIBRILLATION WITH RVR (HCC): Primary | ICD-10-CM

## 2021-02-23 NOTE — TELEPHONE ENCOUNTER
Nicholas Ray from [facility] called requesting a referral for pt. He is seeing Dr. Valeriy Alford tomorrow for a follow up hospital discharge. Fax number [phone].

## 2021-02-23 NOTE — PROGRESS NOTES
[address]      HISTORY   CHIEF COMPLAINT: New onset A Fib w/ RVR/CAD s/p CABG x 3 and TALISHA amputation/HTN, uncontrolled/T2 DM, uncontrolled  HPI: Jaquan Woo is a 72year old male here today for hospit mouth daily. , Disp: 30 tablet, Rfl: 3    •  atorvastatin 40 MG Oral Tab, Take 1 tablet (40 mg total) by mouth nightly., Disp: 30 tablet, Rfl: 3    •  Metoprolol Succinate ER 50 MG Oral Tablet 24 Hr, Take 1 tablet (50 mg total) by mouth Daily Beta Blocker. , on 2/11/2021 at 3:39 PM     Finalized by (CST): Quintin Baac MD on 2/11/2021 at 3:40 PM       Xr Chest Ap Portable  (cpt=71045)    Result Date: 2/11/2021  CONCLUSION:  No significant change in the appearance of the portable chest radiograph.   Support tubes epistaxis, loss of smell, sinus pain, change in voice, difficulty swallowing, dry mouth, sore throat, nasal congestion, sinus pain   RESPIRATORY: denies cough, +mild dyspnea with exertion especially when going up stairs but resolves quickly w/ rest, denies deformity, able to stand up unassisted to manage clothing to allow exam of incisions/skin   EXTREMITIES: no cyanosis or clubbing, trace edema to b/l toes  NEURO: CN II-XII intact, motor and sensory are grossly intact, oriented x3  PSYCHIATRIC: appropriate times daily. May start 2/18/21 at evening dose., Disp: 60 tablet, Rfl: 3    •  Insulin Pen Needle (PEN NEEDLES) 32G X 4 MM Does not apply Misc, 1 each by Does not apply route daily.  Use with insulin pen once daily, Disp: 50 each, Rfl: 1    •  Lancets Ultra true:  Communication with the patient was made within 2 business days of discharge on date above   Medical Decision Making- Based on service period of discharge to 30 days:   · Number of Possible Diagnoses and/or Management Options: moderate  · Amount and/ [facility]  [phone]

## 2021-02-24 ENCOUNTER — OFFICE VISIT (OUTPATIENT)
Dept: CARDIOLOGY | Age: 66
End: 2021-02-24

## 2021-02-24 ENCOUNTER — TELEPHONE (OUTPATIENT)
Dept: CARDIOLOGY | Age: 66
End: 2021-02-24

## 2021-02-24 ENCOUNTER — OFFICE VISIT (OUTPATIENT)
Dept: INTERNAL MEDICINE CLINIC | Facility: CLINIC | Age: 66
End: 2021-02-24

## 2021-02-24 VITALS
DIASTOLIC BLOOD PRESSURE: 54 MMHG | SYSTOLIC BLOOD PRESSURE: 110 MMHG | HEIGHT: 67 IN | WEIGHT: 189 LBS | BODY MASS INDEX: 29.66 KG/M2 | HEART RATE: 84 BPM

## 2021-02-24 VITALS
DIASTOLIC BLOOD PRESSURE: 78 MMHG | WEIGHT: 190 LBS | HEART RATE: 66 BPM | SYSTOLIC BLOOD PRESSURE: 112 MMHG | OXYGEN SATURATION: 97 % | BODY MASS INDEX: 29.47 KG/M2 | RESPIRATION RATE: 16 BRPM | HEIGHT: 67.5 IN | TEMPERATURE: 97 F

## 2021-02-24 DIAGNOSIS — I25.10 CORONARY ARTERY DISEASE INVOLVING NATIVE CORONARY ARTERY OF NATIVE HEART WITHOUT ANGINA PECTORIS: ICD-10-CM

## 2021-02-24 DIAGNOSIS — I10 ESSENTIAL HYPERTENSION: ICD-10-CM

## 2021-02-24 DIAGNOSIS — I48.91 ATRIAL FIBRILLATION WITH RVR (HCC): ICD-10-CM

## 2021-02-24 DIAGNOSIS — E78.2 MIXED HYPERLIPIDEMIA: ICD-10-CM

## 2021-02-24 DIAGNOSIS — I48.92 ATRIAL FLUTTER, UNSPECIFIED TYPE (CMD): ICD-10-CM

## 2021-02-24 DIAGNOSIS — I48.91 ATRIAL FIBRILLATION WITH RVR (CMD): Primary | ICD-10-CM

## 2021-02-24 DIAGNOSIS — Z95.1 S/P CABG (CORONARY ARTERY BYPASS GRAFT): ICD-10-CM

## 2021-02-24 DIAGNOSIS — I25.10 CORONARY ARTERY DISEASE INVOLVING NATIVE CORONARY ARTERY OF NATIVE HEART WITHOUT ANGINA PECTORIS: Primary | ICD-10-CM

## 2021-02-24 DIAGNOSIS — E11.65 UNCONTROLLED TYPE 2 DIABETES MELLITUS WITH HYPERGLYCEMIA (HCC): ICD-10-CM

## 2021-02-24 PROBLEM — E11.9 TYPE 2 DIABETES MELLITUS WITHOUT COMPLICATION, WITHOUT LONG-TERM CURRENT USE OF INSULIN (CMD): Status: ACTIVE | Noted: 2021-02-24

## 2021-02-24 PROBLEM — N18.30 STAGE 3 CHRONIC KIDNEY DISEASE (CMD): Status: ACTIVE | Noted: 2021-02-24

## 2021-02-24 PROCEDURE — 1111F DSCHRG MED/CURRENT MED MERGE: CPT | Performed by: NURSE PRACTITIONER

## 2021-02-24 PROCEDURE — 3008F BODY MASS INDEX DOCD: CPT | Performed by: NURSE PRACTITIONER

## 2021-02-24 PROCEDURE — 3074F SYST BP LT 130 MM HG: CPT | Performed by: NURSE PRACTITIONER

## 2021-02-24 PROCEDURE — 99495 TRANSJ CARE MGMT MOD F2F 14D: CPT | Performed by: NURSE PRACTITIONER

## 2021-02-24 PROCEDURE — 3078F DIAST BP <80 MM HG: CPT | Performed by: NURSE PRACTITIONER

## 2021-02-24 PROCEDURE — 99204 OFFICE O/P NEW MOD 45 MIN: CPT | Performed by: NURSE PRACTITIONER

## 2021-02-24 PROCEDURE — 93000 ELECTROCARDIOGRAM COMPLETE: CPT | Performed by: NURSE PRACTITIONER

## 2021-02-24 RX ORDER — AMIODARONE HYDROCHLORIDE 200 MG/1
200 TABLET ORAL DAILY
COMMUNITY
Start: 2021-02-17 | End: 2021-06-14 | Stop reason: SDUPTHER

## 2021-02-24 RX ORDER — PEN NEEDLE, DIABETIC 30 GX3/16"
1 NEEDLE, DISPOSABLE MISCELLANEOUS
COMMUNITY
Start: 2021-02-17

## 2021-02-24 RX ORDER — APIXABAN 5 MG/1
TABLET, FILM COATED ORAL
COMMUNITY
Start: 2021-02-17 | End: 2021-06-14 | Stop reason: SDUPTHER

## 2021-02-24 RX ORDER — ATORVASTATIN CALCIUM 40 MG/1
40 TABLET, FILM COATED ORAL NIGHTLY
COMMUNITY
Start: 2021-02-17 | End: 2021-06-14 | Stop reason: SDUPTHER

## 2021-02-24 RX ORDER — METOPROLOL SUCCINATE 50 MG/1
TABLET, EXTENDED RELEASE ORAL
COMMUNITY
Start: 2021-02-18 | End: 2021-05-12 | Stop reason: SDUPTHER

## 2021-02-24 RX ORDER — INSULIN DETEMIR 100 [IU]/ML
INJECTION, SOLUTION SUBCUTANEOUS
COMMUNITY
Start: 2021-02-17

## 2021-02-24 RX ORDER — LANCETS
EACH MISCELLANEOUS
COMMUNITY
Start: 2021-02-17 | End: 2021-05-12 | Stop reason: CLARIF

## 2021-02-24 RX ORDER — ACETAMINOPHEN 500 MG
TABLET ORAL EVERY 6 HOURS PRN
COMMUNITY
End: 2021-08-12 | Stop reason: ALTCHOICE

## 2021-02-24 RX ORDER — IRBESARTAN 300 MG/1
300 TABLET ORAL NIGHTLY
COMMUNITY
Start: 2021-02-18 | End: 2021-05-12

## 2021-02-24 RX ORDER — HYDROCODONE BITARTRATE AND ACETAMINOPHEN 5; 325 MG/1; MG/1
1-2 TABLET ORAL EVERY 6 HOURS PRN
COMMUNITY
Start: 2021-02-17 | End: 2021-05-12

## 2021-02-24 SDOH — HEALTH STABILITY: PHYSICAL HEALTH: ON AVERAGE, HOW MANY MINUTES DO YOU ENGAGE IN EXERCISE AT THIS LEVEL?: 30 MIN

## 2021-02-24 SDOH — HEALTH STABILITY: PHYSICAL HEALTH: ON AVERAGE, HOW MANY DAYS PER WEEK DO YOU ENGAGE IN MODERATE TO STRENUOUS EXERCISE (LIKE A BRISK WALK)?: 7 DAYS

## 2021-02-24 SDOH — SOCIAL STABILITY: SOCIAL NETWORK: ARE YOU MARRIED, WIDOWED, DIVORCED, SEPARATED, NEVER MARRIED, OR LIVING WITH A PARTNER?: MARRIED

## 2021-02-24 ASSESSMENT — ENCOUNTER SYMPTOMS
SHORTNESS OF BREATH: 0
SYNCOPE: 0
COUGH: 0
ORTHOPNEA: 0
FEVER: 0
NEAR-SYNCOPE: 0
ABDOMINAL PAIN: 0
BLOATING: 0
NIGHT SWEATS: 0

## 2021-02-24 ASSESSMENT — PATIENT HEALTH QUESTIONNAIRE - PHQ9
1. LITTLE INTEREST OR PLEASURE IN DOING THINGS: NOT AT ALL
2. FEELING DOWN, DEPRESSED OR HOPELESS: NOT AT ALL
SUM OF ALL RESPONSES TO PHQ9 QUESTIONS 1 AND 2: 0
SUM OF ALL RESPONSES TO PHQ9 QUESTIONS 1 AND 2: 0
CLINICAL INTERPRETATION OF PHQ9 SCORE: NO FURTHER SCREENING NEEDED
CLINICAL INTERPRETATION OF PHQ2 SCORE: NO FURTHER SCREENING NEEDED

## 2021-02-24 NOTE — PROGRESS NOTES
Attempted to reach the patient to complete [facility]-Hospital FU call. NCM unable to leave a message for the patient. The patient's voicemail box is full.
Initial Post Discharge Follow Up   Discharge Date: 2/17/21  Contact Date: 2/18/2021    Consent Verification:  Assessment Completed With: Patient  HIPAA Verified?   Yes    Discharge Dx:     CAD s/p CABG and amputation of TALISHA  New onset afib w/ RVR  HUSSAIN  
Several attempts made to reach the patient with no return call. Patient completed HFU on 2/24/2021. Closing encounter.
Patient

## 2021-02-24 NOTE — PROGRESS NOTES
TRANSITIONAL CARE CLINIC PHARMACIST MEDICATION RECONCILIATION        Christina Barney MRN [id number]    3/26/1955 PCP Juan José Stoll MD       Comments: Medication history completed by the [facility] Pharmacist with the patient, d (six) hours as needed for Pain. (Patient not taking: Reported on 2/24/2021 )     Medication Adherence Assessment:   Patient reports taking all new medications as prescribed. Denies any signs of bleeding, lightheadedness, dizziness, or low blood sugars.   R

## 2021-03-09 PROBLEM — E11.9 TYPE 2 DIABETES MELLITUS WITHOUT COMPLICATION, WITHOUT LONG-TERM CURRENT USE OF INSULIN (HCC): Status: ACTIVE | Noted: 2021-02-24

## 2021-03-09 PROBLEM — N18.30 STAGE 3 CHRONIC KIDNEY DISEASE (HCC): Status: ACTIVE | Noted: 2021-02-24

## 2021-03-10 ENCOUNTER — LAB ENCOUNTER (OUTPATIENT)
Dept: LAB | Facility: HOSPITAL | Age: 66
End: 2021-03-10
Attending: EMERGENCY MEDICINE
Payer: COMMERCIAL

## 2021-03-10 ENCOUNTER — HOSPITAL ENCOUNTER (OUTPATIENT)
Dept: GENERAL RADIOLOGY | Facility: HOSPITAL | Age: 66
Discharge: HOME | End: 2021-03-10
Attending: THORACIC SURGERY (CARDIOTHORACIC VASCULAR SURGERY)
Payer: COMMERCIAL

## 2021-03-10 DIAGNOSIS — I15.2 HYPERTENSION ASSOCIATED WITH DIABETES (HCC): ICD-10-CM

## 2021-03-10 DIAGNOSIS — E11.59 HYPERTENSION ASSOCIATED WITH DIABETES (HCC): ICD-10-CM

## 2021-03-10 DIAGNOSIS — E11.65 UNCONTROLLED TYPE 2 DIABETES MELLITUS WITH HYPERGLYCEMIA (HCC): ICD-10-CM

## 2021-03-10 DIAGNOSIS — J90 PLEURAL EFFUSION: ICD-10-CM

## 2021-03-10 DIAGNOSIS — Z13.228 SCREENING FOR ENDOCRINE, NUTRITIONAL, METABOLIC AND IMMUNITY DISORDER: ICD-10-CM

## 2021-03-10 DIAGNOSIS — Z13.21 SCREENING FOR ENDOCRINE, NUTRITIONAL, METABOLIC AND IMMUNITY DISORDER: ICD-10-CM

## 2021-03-10 DIAGNOSIS — Z12.5 SCREENING FOR PROSTATE CANCER: ICD-10-CM

## 2021-03-10 DIAGNOSIS — Z13.29 SCREENING FOR ENDOCRINE, NUTRITIONAL, METABOLIC AND IMMUNITY DISORDER: ICD-10-CM

## 2021-03-10 DIAGNOSIS — Z13.0 SCREENING FOR ENDOCRINE, NUTRITIONAL, METABOLIC AND IMMUNITY DISORDER: ICD-10-CM

## 2021-03-10 LAB
ALBUMIN SERPL-MCNC: 3.3 G/DL (ref 3.4–5)
ALBUMIN/GLOB SERPL: 0.7 {RATIO} (ref 1–2)
ALP LIVER SERPL-CCNC: 113 U/L
ALT SERPL-CCNC: 45 U/L
ANION GAP SERPL CALC-SCNC: 5 MMOL/L (ref 0–18)
AST SERPL-CCNC: 34 U/L (ref 15–37)
BASOPHILS # BLD AUTO: 0.08 X10(3) UL (ref 0–0.2)
BASOPHILS NFR BLD AUTO: 1.1 %
BILIRUB SERPL-MCNC: 0.4 MG/DL (ref 0.1–2)
BUN BLD-MCNC: 51 MG/DL (ref 7–18)
BUN/CREAT SERPL: 23.9 (ref 10–20)
CALCIUM BLD-MCNC: 9 MG/DL (ref 8.5–10.1)
CHLORIDE SERPL-SCNC: 109 MMOL/L (ref 98–112)
CO2 SERPL-SCNC: 24 MMOL/L (ref 21–32)
COMPLEXED PSA SERPL-MCNC: 1.55 NG/ML (ref ?–4)
CREAT BLD-MCNC: 2.13 MG/DL
CREAT UR-SCNC: 202 MG/DL
DEPRECATED RDW RBC AUTO: 41.6 FL (ref 35.1–46.3)
EOSINOPHIL # BLD AUTO: 0.58 X10(3) UL (ref 0–0.7)
EOSINOPHIL NFR BLD AUTO: 8.3 %
ERYTHROCYTE [DISTWIDTH] IN BLOOD BY AUTOMATED COUNT: 12.8 % (ref 11–15)
EST. AVERAGE GLUCOSE BLD GHB EST-MCNC: 160 MG/DL (ref 68–126)
GLOBULIN PLAS-MCNC: 4.5 G/DL (ref 2.8–4.4)
GLUCOSE BLD-MCNC: 108 MG/DL (ref 70–99)
HBA1C MFR BLD HPLC: 7.2 % (ref ?–5.7)
HCT VFR BLD AUTO: 42.1 %
HGB BLD-MCNC: 13.7 G/DL
IMM GRANULOCYTES # BLD AUTO: 0.02 X10(3) UL (ref 0–1)
IMM GRANULOCYTES NFR BLD: 0.3 %
LYMPHOCYTES # BLD AUTO: 1.25 X10(3) UL (ref 1–4)
LYMPHOCYTES NFR BLD AUTO: 17.9 %
M PROTEIN MFR SERPL ELPH: 7.8 G/DL (ref 6.4–8.2)
MCH RBC QN AUTO: 28.8 PG (ref 26–34)
MCHC RBC AUTO-ENTMCNC: 32.5 G/DL (ref 31–37)
MCV RBC AUTO: 88.6 FL
MICROALBUMIN UR-MCNC: 205 MG/DL
MICROALBUMIN/CREAT 24H UR-RTO: 1014.9 UG/MG (ref ?–30)
MONOCYTES # BLD AUTO: 0.63 X10(3) UL (ref 0.1–1)
MONOCYTES NFR BLD AUTO: 9 %
NEUTROPHILS # BLD AUTO: 4.42 X10 (3) UL (ref 1.5–7.7)
NEUTROPHILS # BLD AUTO: 4.42 X10(3) UL (ref 1.5–7.7)
NEUTROPHILS NFR BLD AUTO: 63.4 %
OSMOLALITY SERPL CALC.SUM OF ELEC: 300 MOSM/KG (ref 275–295)
PATIENT FASTING Y/N/NP: NO
PLATELET # BLD AUTO: 230 10(3)UL (ref 150–450)
POTASSIUM SERPL-SCNC: 5.7 MMOL/L (ref 3.5–5.1)
RBC # BLD AUTO: 4.75 X10(6)UL
SODIUM SERPL-SCNC: 138 MMOL/L (ref 136–145)
T4 FREE SERPL-MCNC: 1.1 NG/DL (ref 0.8–1.7)
TSI SER-ACNC: 6.41 MIU/ML (ref 0.36–3.74)
WBC # BLD AUTO: 7 X10(3) UL (ref 4–11)

## 2021-03-10 PROCEDURE — 84443 ASSAY THYROID STIM HORMONE: CPT

## 2021-03-10 PROCEDURE — 84439 ASSAY OF FREE THYROXINE: CPT

## 2021-03-10 PROCEDURE — 3051F HG A1C>EQUAL 7.0%<8.0%: CPT | Performed by: EMERGENCY MEDICINE

## 2021-03-10 PROCEDURE — 85025 COMPLETE CBC W/AUTO DIFF WBC: CPT

## 2021-03-10 PROCEDURE — 82043 UR ALBUMIN QUANTITATIVE: CPT

## 2021-03-10 PROCEDURE — 80053 COMPREHEN METABOLIC PANEL: CPT

## 2021-03-10 PROCEDURE — 71048 X-RAY EXAM CHEST 4+ VIEWS: CPT | Performed by: THORACIC SURGERY (CARDIOTHORACIC VASCULAR SURGERY)

## 2021-03-10 PROCEDURE — 83036 HEMOGLOBIN GLYCOSYLATED A1C: CPT

## 2021-03-10 PROCEDURE — 36415 COLL VENOUS BLD VENIPUNCTURE: CPT

## 2021-03-10 PROCEDURE — 82570 ASSAY OF URINE CREATININE: CPT

## 2021-03-11 ENCOUNTER — TELEPHONE (OUTPATIENT)
Dept: FAMILY MEDICINE CLINIC | Facility: CLINIC | Age: 66
End: 2021-03-11

## 2021-03-11 ENCOUNTER — OFFICE VISIT (OUTPATIENT)
Dept: FAMILY MEDICINE CLINIC | Facility: CLINIC | Age: 66
End: 2021-03-11

## 2021-03-11 ENCOUNTER — TELEPHONE (OUTPATIENT)
Dept: CARDIOLOGY | Age: 66
End: 2021-03-11

## 2021-03-11 VITALS
WEIGHT: 186 LBS | BODY MASS INDEX: 29 KG/M2 | DIASTOLIC BLOOD PRESSURE: 85 MMHG | HEART RATE: 68 BPM | TEMPERATURE: 96 F | OXYGEN SATURATION: 98 % | SYSTOLIC BLOOD PRESSURE: 135 MMHG | RESPIRATION RATE: 18 BRPM

## 2021-03-11 DIAGNOSIS — E11.65 UNCONTROLLED TYPE 2 DIABETES MELLITUS WITH HYPERGLYCEMIA (HCC): ICD-10-CM

## 2021-03-11 DIAGNOSIS — I25.10 CORONARY ARTERY DISEASE INVOLVING NATIVE CORONARY ARTERY OF NATIVE HEART WITHOUT ANGINA PECTORIS: Primary | ICD-10-CM

## 2021-03-11 DIAGNOSIS — N17.9 ACUTE KIDNEY INJURY (HCC): ICD-10-CM

## 2021-03-11 DIAGNOSIS — I48.91 ATRIAL FIBRILLATION WITH RVR (HCC): ICD-10-CM

## 2021-03-11 PROCEDURE — 1111F DSCHRG MED/CURRENT MED MERGE: CPT | Performed by: EMERGENCY MEDICINE

## 2021-03-11 PROCEDURE — 99214 OFFICE O/P EST MOD 30 MIN: CPT | Performed by: EMERGENCY MEDICINE

## 2021-03-11 PROCEDURE — 3075F SYST BP GE 130 - 139MM HG: CPT | Performed by: EMERGENCY MEDICINE

## 2021-03-11 PROCEDURE — 3079F DIAST BP 80-89 MM HG: CPT | Performed by: EMERGENCY MEDICINE

## 2021-03-11 NOTE — PATIENT INSTRUCTIONS
Thank you for choosing [address] [place] [address] Group  To Do:  FOR INOCENTE Garcia        1. Follow up with Dr. Tony Burch as scheduled 3/24/21  2. Continue with all diabetic medications  3. Clarify if you are taking Januvia  4. Push fluids  5.  Recheck blood te pressure  · Eating more fresh vegetables and fruits  · Choosing frozen vegetables instead of canned, which can be very high in salt  · Eating lean proteins, such as fish, poultry, and legumes (beans and peas)  · Eating less red meat and processed or canned a heart or attack or stroke. Smoking cessation  If you use tobacco in any form, you should stop. This includes cigarettes, electronic cigarettes, a vape-pen, and smokeless tobacco. Use of these products are bad for your arteries and your lungs.  Work with gentle back rub may help relax sore muscles in your back and shoulders. If the surgeon used a mammary artery, you may feel a pulling or tightness in the front of your chest on the side the artery was used. You can use a warm pack to ease the tightness.  Ask

## 2021-03-11 NOTE — TELEPHONE ENCOUNTER
Patient daughter is looking to see if the dad is healthy to get the COVID shot.  Dad had appointment today and he forgot to ask and daughter is looking around to schedule the appointment for him but wanted to make sure he is good to go

## 2021-03-11 NOTE — PROGRESS NOTES
Chief Complaint:   Patient presents with:  Hospital F/U: CABG    HPI:   This is a 77year old male       [facility]    On Insulin 10 units daily  Doing well no hypoglycemic episodes  Eating well,   Patient is unsure if he is taking Januvia.     Pa skin every morning., Disp: 3 mL, Rfl: 2  amiodarone HCl 200 MG Oral Tab, Take 1 tablet (200 mg total) by mouth daily. , Disp: 30 tablet, Rfl: 3  atorvastatin 40 MG Oral Tab, Take 1 tablet (40 mg total) by mouth nightly., Disp: 30 tablet, Rfl: 3  Metoprolol thyromegaly  LUNGS: clear to auscultation, no RRW  CARDIO: RRR without murmur  EXTREMITIES: no cyanosis, clubbing or edema  GI:soft Nontender Normoactive BS.   No palpable masses no guarding rigidity no rebound tenderness    Recent Results (from the past 67 15.0 %    RDW-SD 41.6 35.1 - 46.3 fL    Neutrophil Absolute Prelim 4.42 1.50 - 7.70 x10 (3) uL    Neutrophil Absolute 4.42 1.50 - 7.70 x10(3) uL    Lymphocyte Absolute 1.25 1.00 - 4.00 x10(3) uL    Monocyte Absolute 0.63 0.10 - 1.00 x10(3) uL    Eosinophil 37 U/L    ALT 40 16 - 61 U/L    Alkaline Phosphatase 117 45 - 117 U/L    Bilirubin, Total 0.6 0.1 - 2.0 mg/dL    Total Protein 8.0 6.4 - 8.2 g/dL    Albumin 3.6 3.4 - 5.0 g/dL    Globulin  4.4 2.8 - 4.4 g/dL    A/G Ratio 0.8 (L) 1.0 - 2.0    FASTING Yes Detected   TSH+FREE T4    Collection Time: 03/24/21  9:04 AM   Result Value Ref Range    Free T4 1.1 0.8 - 1.7 ng/dL    TSH 6.320 (H) 0.358 - 3.740 mIU/mL           ASSESSMENT AND PLAN:         1.  Coronary artery disease involving native coronary artery of

## 2021-03-11 NOTE — TELEPHONE ENCOUNTER
PSR: Please lync triage when daughter calls. Thank you. Called daughter, no answer, left vm to call office back.

## 2021-03-11 NOTE — TELEPHONE ENCOUNTER
Okay for pt to receive covid19 vaccine per Dr. Sushant Arroyo. Dr. Sushant Arroyo also requesting for nursing to go over pt's AVS, especially #3 & #6. Called pt's daughter and left vm to call office when available. Office number provided. Awaiting call back.

## 2021-03-12 RX ORDER — PEN NEEDLE, DIABETIC 30 GX3/16"
1 NEEDLE, DISPOSABLE MISCELLANEOUS DAILY
Qty: 50 EACH | Refills: 1 | Status: SHIPPED | OUTPATIENT
Start: 2021-03-12 | End: 2021-07-23

## 2021-03-12 NOTE — TELEPHONE ENCOUNTER
Daughter returned call. AVS instructions reviewed. Daughter states pt is taking Januvia 50mg Daily. Dr. Calin Bolden information provided.  Daughter then states pt is to have a thoracentesis on 3/16 at 0945 at [facility] and daughter was informed by cardiac

## 2021-03-12 NOTE — TELEPHONE ENCOUNTER
PSR: Please [facility] triage when daughter calls. Thank you. Verbal order from Dr. Calzada 32: Eun Grimes on the day of the procedure. Restart Januvia the day after procedure. Hold insulin in the morning and okay for insulin in the evening.    Called aria

## 2021-03-13 ENCOUNTER — LAB ENCOUNTER (OUTPATIENT)
Dept: LAB | Facility: HOSPITAL | Age: 66
End: 2021-03-13
Attending: THORACIC SURGERY (CARDIOTHORACIC VASCULAR SURGERY)
Payer: COMMERCIAL

## 2021-03-13 DIAGNOSIS — J90 PLEURAL EFFUSION: ICD-10-CM

## 2021-03-14 LAB — SARS-COV-2 RNA RESP QL NAA+PROBE: NOT DETECTED

## 2021-03-15 ENCOUNTER — TELEPHONE (OUTPATIENT)
Dept: CARDIOLOGY | Age: 66
End: 2021-03-15

## 2021-03-15 ENCOUNTER — LAB ENCOUNTER (OUTPATIENT)
Dept: LAB | Facility: HOSPITAL | Age: 66
End: 2021-03-15
Attending: EMERGENCY MEDICINE
Payer: COMMERCIAL

## 2021-03-15 ENCOUNTER — TELEPHONE (OUTPATIENT)
Dept: FAMILY MEDICINE CLINIC | Facility: CLINIC | Age: 66
End: 2021-03-15

## 2021-03-15 DIAGNOSIS — Z13.0 SCREENING FOR ENDOCRINE, NUTRITIONAL, METABOLIC AND IMMUNITY DISORDER: ICD-10-CM

## 2021-03-15 DIAGNOSIS — Z13.228 SCREENING FOR ENDOCRINE, NUTRITIONAL, METABOLIC AND IMMUNITY DISORDER: ICD-10-CM

## 2021-03-15 DIAGNOSIS — Z12.5 SCREENING FOR PROSTATE CANCER: ICD-10-CM

## 2021-03-15 DIAGNOSIS — E11.65 UNCONTROLLED TYPE 2 DIABETES MELLITUS WITH HYPERGLYCEMIA (HCC): ICD-10-CM

## 2021-03-15 DIAGNOSIS — R79.89 ABNORMAL THYROID BLOOD TEST: Primary | ICD-10-CM

## 2021-03-15 DIAGNOSIS — Z13.29 SCREENING FOR ENDOCRINE, NUTRITIONAL, METABOLIC AND IMMUNITY DISORDER: ICD-10-CM

## 2021-03-15 DIAGNOSIS — I15.2 HYPERTENSION ASSOCIATED WITH DIABETES (HCC): ICD-10-CM

## 2021-03-15 DIAGNOSIS — Z13.21 SCREENING FOR ENDOCRINE, NUTRITIONAL, METABOLIC AND IMMUNITY DISORDER: ICD-10-CM

## 2021-03-15 DIAGNOSIS — E11.59 HYPERTENSION ASSOCIATED WITH DIABETES (HCC): ICD-10-CM

## 2021-03-15 LAB
CHOLEST SMN-MCNC: 117 MG/DL (ref ?–200)
HDLC SERPL-MCNC: 35 MG/DL (ref 40–59)
LDLC SERPL CALC-MCNC: 63 MG/DL (ref ?–100)
NONHDLC SERPL-MCNC: 82 MG/DL (ref ?–130)
PATIENT FASTING Y/N/NP: YES
TRIGL SERPL-MCNC: 97 MG/DL (ref 30–149)
VLDLC SERPL CALC-MCNC: 19 MG/DL (ref 0–30)

## 2021-03-15 PROCEDURE — 80061 LIPID PANEL: CPT

## 2021-03-15 PROCEDURE — 36415 COLL VENOUS BLD VENIPUNCTURE: CPT

## 2021-03-15 NOTE — TELEPHONE ENCOUNTER
Daughter in law (not daughter) as she stated, \"I am calling about my father in law\", called back and informed her of below recommendations from Dr. Zahida Torrez. Daughter in law verbalized understanding.

## 2021-03-15 NOTE — TELEPHONE ENCOUNTER
----- Message from Lazarus Scheuermann, APRN sent at 3/15/2021 12:00 PM CDT -----  Diabetes well controlled -continue low sugar , low carb diet , TSH -abnormal - will repeat TSH in 2 weeks

## 2021-03-15 NOTE — TELEPHONE ENCOUNTER
Spoke to pt's daughter in law and she already knew about the results except for the TSH. Discussed and she verbalized understanding.   Order in system

## 2021-03-16 ENCOUNTER — ORDER TRANSCRIPTION (OUTPATIENT)
Dept: ADMINISTRATIVE | Facility: HOSPITAL | Age: 66
End: 2021-03-16

## 2021-03-16 ENCOUNTER — NURSE ONLY (OUTPATIENT)
Dept: LAB | Facility: HOSPITAL | Age: 66
End: 2021-03-16
Attending: THORACIC SURGERY (CARDIOTHORACIC VASCULAR SURGERY)
Payer: COMMERCIAL

## 2021-03-16 ENCOUNTER — HOSPITAL ENCOUNTER (OUTPATIENT)
Dept: GENERAL RADIOLOGY | Facility: HOSPITAL | Age: 66
Discharge: HOME | End: 2021-03-16
Attending: RADIOLOGY
Payer: COMMERCIAL

## 2021-03-16 ENCOUNTER — HOSPITAL ENCOUNTER (OUTPATIENT)
Dept: ULTRASOUND IMAGING | Facility: HOSPITAL | Age: 66
Discharge: HOME | End: 2021-03-16
Attending: THORACIC SURGERY (CARDIOTHORACIC VASCULAR SURGERY)
Payer: COMMERCIAL

## 2021-03-16 VITALS
HEIGHT: 67.5 IN | BODY MASS INDEX: 28.39 KG/M2 | DIASTOLIC BLOOD PRESSURE: 89 MMHG | TEMPERATURE: 98 F | RESPIRATION RATE: 18 BRPM | HEART RATE: 83 BPM | SYSTOLIC BLOOD PRESSURE: 122 MMHG | WEIGHT: 183 LBS | OXYGEN SATURATION: 95 %

## 2021-03-16 DIAGNOSIS — Z13.0 SCREENING FOR ENDOCRINE, NUTRITIONAL, METABOLIC AND IMMUNITY DISORDER: ICD-10-CM

## 2021-03-16 DIAGNOSIS — Z13.228 SCREENING FOR ENDOCRINE, NUTRITIONAL, METABOLIC AND IMMUNITY DISORDER: ICD-10-CM

## 2021-03-16 DIAGNOSIS — Z13.29 SCREENING FOR ENDOCRINE, NUTRITIONAL, METABOLIC AND IMMUNITY DISORDER: ICD-10-CM

## 2021-03-16 DIAGNOSIS — J90 PLEURAL EFFUSION: ICD-10-CM

## 2021-03-16 DIAGNOSIS — Z01.818 PREOP EXAMINATION: Primary | ICD-10-CM

## 2021-03-16 DIAGNOSIS — J90 PLEURAL EFFUSION: Primary | ICD-10-CM

## 2021-03-16 DIAGNOSIS — Z13.21 SCREENING FOR ENDOCRINE, NUTRITIONAL, METABOLIC AND IMMUNITY DISORDER: ICD-10-CM

## 2021-03-16 LAB
ALBUMIN SERPL-MCNC: 3.6 G/DL (ref 3.4–5)
ALBUMIN/GLOB SERPL: 0.8 {RATIO} (ref 1–2)
ALP LIVER SERPL-CCNC: 117 U/L
ALT SERPL-CCNC: 40 U/L
ANION GAP SERPL CALC-SCNC: 4 MMOL/L (ref 0–18)
AST SERPL-CCNC: 27 U/L (ref 15–37)
BASOPHILS # BLD AUTO: 0.07 X10(3) UL (ref 0–0.2)
BASOPHILS NFR BLD AUTO: 0.9 %
BILIRUB SERPL-MCNC: 0.6 MG/DL (ref 0.1–2)
BUN BLD-MCNC: 40 MG/DL (ref 7–18)
BUN/CREAT SERPL: 19 (ref 10–20)
CALCIUM BLD-MCNC: 9 MG/DL (ref 8.5–10.1)
CHLORIDE SERPL-SCNC: 107 MMOL/L (ref 98–112)
CHOLEST SMN-MCNC: 121 MG/DL (ref ?–200)
CO2 SERPL-SCNC: 25 MMOL/L (ref 21–32)
CREAT BLD-MCNC: 2.11 MG/DL
DEPRECATED RDW RBC AUTO: 41.4 FL (ref 35.1–46.3)
EOSINOPHIL # BLD AUTO: 0.57 X10(3) UL (ref 0–0.7)
EOSINOPHIL NFR BLD AUTO: 7.3 %
ERYTHROCYTE [DISTWIDTH] IN BLOOD BY AUTOMATED COUNT: 12.8 % (ref 11–15)
GLOBULIN PLAS-MCNC: 4.4 G/DL (ref 2.8–4.4)
GLUCOSE BLD-MCNC: 118 MG/DL (ref 70–99)
GLUCOSE BLD-MCNC: 125 MG/DL (ref 70–99)
HCT VFR BLD AUTO: 44.8 %
HDLC SERPL-MCNC: 34 MG/DL (ref 40–59)
HGB BLD-MCNC: 14.6 G/DL
IMM GRANULOCYTES # BLD AUTO: 0.02 X10(3) UL (ref 0–1)
IMM GRANULOCYTES NFR BLD: 0.3 %
INR BLD: 1.04 (ref 0.89–1.11)
LDLC SERPL CALC-MCNC: 66 MG/DL (ref ?–100)
LYMPHOCYTES # BLD AUTO: 1.41 X10(3) UL (ref 1–4)
LYMPHOCYTES NFR BLD AUTO: 17.9 %
M PROTEIN MFR SERPL ELPH: 8 G/DL (ref 6.4–8.2)
MCH RBC QN AUTO: 28.7 PG (ref 26–34)
MCHC RBC AUTO-ENTMCNC: 32.6 G/DL (ref 31–37)
MCV RBC AUTO: 88.2 FL
MONOCYTES # BLD AUTO: 0.57 X10(3) UL (ref 0.1–1)
MONOCYTES NFR BLD AUTO: 7.3 %
NEUTROPHILS # BLD AUTO: 5.22 X10 (3) UL (ref 1.5–7.7)
NEUTROPHILS # BLD AUTO: 5.22 X10(3) UL (ref 1.5–7.7)
NEUTROPHILS NFR BLD AUTO: 66.3 %
NONHDLC SERPL-MCNC: 87 MG/DL (ref ?–130)
OSMOLALITY SERPL CALC.SUM OF ELEC: 293 MOSM/KG (ref 275–295)
PATIENT FASTING Y/N/NP: YES
PATIENT FASTING Y/N/NP: YES
PLATELET # BLD AUTO: 209 10(3)UL (ref 150–450)
POTASSIUM SERPL-SCNC: 5.6 MMOL/L (ref 3.5–5.1)
PSA SERPL DL<=0.01 NG/ML-MCNC: 13.9 SECONDS (ref 12.4–14.6)
RBC # BLD AUTO: 5.08 X10(6)UL
SODIUM SERPL-SCNC: 136 MMOL/L (ref 136–145)
T4 FREE SERPL-MCNC: 1.2 NG/DL (ref 0.8–1.7)
TRIGL SERPL-MCNC: 105 MG/DL (ref 30–149)
TSI SER-ACNC: 7.35 MIU/ML (ref 0.36–3.74)
VLDLC SERPL CALC-MCNC: 21 MG/DL (ref 0–30)
WBC # BLD AUTO: 7.9 X10(3) UL (ref 4–11)

## 2021-03-16 PROCEDURE — 82962 GLUCOSE BLOOD TEST: CPT

## 2021-03-16 PROCEDURE — 32555 ASPIRATE PLEURA W/ IMAGING: CPT | Performed by: THORACIC SURGERY (CARDIOTHORACIC VASCULAR SURGERY)

## 2021-03-16 PROCEDURE — 85610 PROTHROMBIN TIME: CPT

## 2021-03-16 PROCEDURE — 80061 LIPID PANEL: CPT

## 2021-03-16 PROCEDURE — 84439 ASSAY OF FREE THYROXINE: CPT

## 2021-03-16 PROCEDURE — 85025 COMPLETE CBC W/AUTO DIFF WBC: CPT

## 2021-03-16 PROCEDURE — 84443 ASSAY THYROID STIM HORMONE: CPT

## 2021-03-16 PROCEDURE — 80053 COMPREHEN METABOLIC PANEL: CPT

## 2021-03-16 PROCEDURE — 71045 X-RAY EXAM CHEST 1 VIEW: CPT | Performed by: RADIOLOGY

## 2021-03-16 PROCEDURE — 36415 COLL VENOUS BLD VENIPUNCTURE: CPT

## 2021-03-16 RX ORDER — ACETAMINOPHEN 325 MG/1
650 TABLET ORAL EVERY 6 HOURS PRN
Status: DISCONTINUED | OUTPATIENT
Start: 2021-03-16 | End: 2021-03-18

## 2021-03-16 RX ORDER — HYDROCODONE BITARTRATE AND ACETAMINOPHEN 5; 325 MG/1; MG/1
1 TABLET ORAL EVERY 4 HOURS PRN
Status: DISCONTINUED | OUTPATIENT
Start: 2021-03-16 | End: 2021-03-18

## 2021-03-16 RX ORDER — MORPHINE SULFATE 2 MG/ML
2 INJECTION, SOLUTION INTRAMUSCULAR; INTRAVENOUS EVERY 2 HOUR PRN
Status: DISCONTINUED | OUTPATIENT
Start: 2021-03-16 | End: 2021-03-18

## 2021-03-16 RX ORDER — HYDROCODONE BITARTRATE AND ACETAMINOPHEN 5; 325 MG/1; MG/1
2 TABLET ORAL EVERY 4 HOURS PRN
Status: DISCONTINUED | OUTPATIENT
Start: 2021-03-16 | End: 2021-03-18

## 2021-03-16 NOTE — IMAGING NOTE
Received in RAD holding. Name,  and allergies verified. . NPO since 3:15 am. Denies pain. Patient is in A flutter which is known to his PCP.  Eliquis last dose 3/13/21 7am. Daughter in law Pam   With patient

## 2021-03-16 NOTE — PROCEDURES
[facility]  Procedure Note    [address] Kath[address] Diaz Patient Status:  Outpatient    3/26/1955 MRN [id number]   Location [address] [place] [address] Attending Monica Rothman MD   Hosp Day # 0 PCP Renu Cross MD     Procedure: US thoracentesis L

## 2021-03-16 NOTE — IMAGING NOTE
Post Thoracentesis 600 ml removed By DR Adelaide Medina. Left mid back with tegaderm dressing. Denies pain. Report given to [facility]  in [address].  Transported to [facility] by [address]

## 2021-03-18 ENCOUNTER — APPOINTMENT (OUTPATIENT)
Dept: CARDIAC REHAB | Facility: HOSPITAL | Age: 66
End: 2021-03-18
Attending: INTERNAL MEDICINE
Payer: COMMERCIAL

## 2021-03-24 ENCOUNTER — LAB ENCOUNTER (OUTPATIENT)
Dept: LAB | Age: 66
End: 2021-03-24
Attending: NURSE PRACTITIONER
Payer: COMMERCIAL

## 2021-03-24 ENCOUNTER — OFFICE VISIT (OUTPATIENT)
Dept: CARDIOLOGY | Age: 66
End: 2021-03-24

## 2021-03-24 VITALS
HEART RATE: 68 BPM | BODY MASS INDEX: 29.18 KG/M2 | WEIGHT: 185.9 LBS | DIASTOLIC BLOOD PRESSURE: 90 MMHG | SYSTOLIC BLOOD PRESSURE: 134 MMHG | HEIGHT: 67 IN

## 2021-03-24 DIAGNOSIS — I10 ESSENTIAL HYPERTENSION: ICD-10-CM

## 2021-03-24 DIAGNOSIS — I25.10 CORONARY ARTERY DISEASE INVOLVING NATIVE CORONARY ARTERY OF NATIVE HEART WITHOUT ANGINA PECTORIS: Primary | ICD-10-CM

## 2021-03-24 DIAGNOSIS — N18.30 STAGE 3 CHRONIC KIDNEY DISEASE, UNSPECIFIED WHETHER STAGE 3A OR 3B CKD (CMD): ICD-10-CM

## 2021-03-24 DIAGNOSIS — I48.91 ATRIAL FIBRILLATION, UNSPECIFIED TYPE (CMD): ICD-10-CM

## 2021-03-24 DIAGNOSIS — E11.9 TYPE 2 DIABETES MELLITUS WITHOUT COMPLICATION, WITHOUT LONG-TERM CURRENT USE OF INSULIN (CMD): ICD-10-CM

## 2021-03-24 DIAGNOSIS — Z95.1 S/P CABG (CORONARY ARTERY BYPASS GRAFT): ICD-10-CM

## 2021-03-24 DIAGNOSIS — R79.89 ABNORMAL THYROID BLOOD TEST: ICD-10-CM

## 2021-03-24 DIAGNOSIS — Z01.818 PREOP EXAMINATION: ICD-10-CM

## 2021-03-24 LAB
T4 FREE SERPL-MCNC: 1.1 NG/DL (ref 0.8–1.7)
TSI SER-ACNC: 6.32 MIU/ML (ref 0.36–3.74)

## 2021-03-24 PROCEDURE — 99214 OFFICE O/P EST MOD 30 MIN: CPT | Performed by: NURSE PRACTITIONER

## 2021-03-24 PROCEDURE — 36415 COLL VENOUS BLD VENIPUNCTURE: CPT

## 2021-03-24 PROCEDURE — 84443 ASSAY THYROID STIM HORMONE: CPT

## 2021-03-24 PROCEDURE — 93000 ELECTROCARDIOGRAM COMPLETE: CPT | Performed by: NURSE PRACTITIONER

## 2021-03-24 PROCEDURE — 84439 ASSAY OF FREE THYROXINE: CPT

## 2021-03-24 PROCEDURE — 3075F SYST BP GE 130 - 139MM HG: CPT | Performed by: NURSE PRACTITIONER

## 2021-03-24 ASSESSMENT — ENCOUNTER SYMPTOMS
FEVER: 0
ORTHOPNEA: 0
NIGHT SWEATS: 0
ABDOMINAL PAIN: 0
NEAR-SYNCOPE: 0
SHORTNESS OF BREATH: 0
COUGH: 0
BLOATING: 0
SYNCOPE: 0

## 2021-03-24 ASSESSMENT — PATIENT HEALTH QUESTIONNAIRE - PHQ9
2. FEELING DOWN, DEPRESSED OR HOPELESS: NOT AT ALL
1. LITTLE INTEREST OR PLEASURE IN DOING THINGS: NOT AT ALL
CLINICAL INTERPRETATION OF PHQ9 SCORE: NO FURTHER SCREENING NEEDED
SUM OF ALL RESPONSES TO PHQ9 QUESTIONS 1 AND 2: 0
SUM OF ALL RESPONSES TO PHQ9 QUESTIONS 1 AND 2: 0
CLINICAL INTERPRETATION OF PHQ2 SCORE: NO FURTHER SCREENING NEEDED

## 2021-03-25 ENCOUNTER — HOSPITAL ENCOUNTER (OUTPATIENT)
Dept: GENERAL RADIOLOGY | Facility: HOSPITAL | Age: 66
Discharge: HOME | End: 2021-03-25
Attending: THORACIC SURGERY (CARDIOTHORACIC VASCULAR SURGERY)
Payer: COMMERCIAL

## 2021-03-25 DIAGNOSIS — J90 PLEURAL EFFUSION: ICD-10-CM

## 2021-03-25 PROCEDURE — 71048 X-RAY EXAM CHEST 4+ VIEWS: CPT | Performed by: THORACIC SURGERY (CARDIOTHORACIC VASCULAR SURGERY)

## 2021-03-26 LAB
SARS-COV-2 RNA RESP QL NAA+PROBE: NOT DETECTED
SARS-COV-2 RNA SPEC QL NAA+PROBE: NOT DETECTED
SPECIMEN SOURCE: NORMAL

## 2021-03-27 ENCOUNTER — HOSPITAL ENCOUNTER (OUTPATIENT)
Dept: CV DIAGNOSTICS | Facility: HOSPITAL | Age: 66
Discharge: HOME | End: 2021-03-27
Attending: NURSE PRACTITIONER
Payer: COMMERCIAL

## 2021-03-27 DIAGNOSIS — Z95.1 S/P CABG (CORONARY ARTERY BYPASS GRAFT): ICD-10-CM

## 2021-03-27 DIAGNOSIS — I48.91 ATRIAL FIBRILLATION WITH RAPID VENTRICULAR RESPONSE (HCC): ICD-10-CM

## 2021-03-27 DIAGNOSIS — I25.10 CORONARY ARTERY DISEASE INVOLVING NATIVE CORONARY ARTERY WITHOUT ANGINA PECTORIS: ICD-10-CM

## 2021-03-27 DIAGNOSIS — E78.2 MIXED HYPERLIPIDEMIA: ICD-10-CM

## 2021-03-27 DIAGNOSIS — I48.92 ATRIAL FLUTTER, UNSPECIFIED TYPE (HCC): ICD-10-CM

## 2021-03-27 PROCEDURE — 93018 CV STRESS TEST I&R ONLY: CPT | Performed by: NURSE PRACTITIONER

## 2021-03-27 PROCEDURE — 93017 CV STRESS TEST TRACING ONLY: CPT | Performed by: NURSE PRACTITIONER

## 2021-03-29 ENCOUNTER — PATIENT MESSAGE (OUTPATIENT)
Dept: FAMILY MEDICINE CLINIC | Facility: CLINIC | Age: 66
End: 2021-03-29

## 2021-03-30 NOTE — TELEPHONE ENCOUNTER
From: Sumeet Ochoa  To: Jamshid Marion MD  Sent: 3/29/2021 4:30 PM CDT  Subject: Non-Urgent Medical Question    Hi Dr. Eleno Ocampo, this is Yeyo Serrano Deny’s daughter in law. Is he allow to drive now?  What are his limitations, physic

## 2021-03-30 NOTE — TELEPHONE ENCOUNTER
Pt's daughter in law sent message asking if pt is allowed to drive and if so what are his limitations, physical wise. Please advise. Thank you.    LOV: 3/11/21

## 2021-04-01 ENCOUNTER — CLINICAL ABSTRACT (OUTPATIENT)
Dept: HEALTH INFORMATION MANAGEMENT | Age: 66
End: 2021-04-01

## 2021-04-01 ENCOUNTER — TELEPHONE (OUTPATIENT)
Dept: CARDIOLOGY | Age: 66
End: 2021-04-01

## 2021-04-01 DIAGNOSIS — I48.91 ATRIAL FIBRILLATION, UNSPECIFIED TYPE (CMD): Primary | ICD-10-CM

## 2021-04-02 DIAGNOSIS — R79.89 ELEVATED TSH: Primary | ICD-10-CM

## 2021-04-04 PROBLEM — Z95.1 S/P CABG (CORONARY ARTERY BYPASS GRAFT): Status: ACTIVE | Noted: 2021-03-24

## 2021-04-04 PROBLEM — I48.91 ATRIAL FIBRILLATION (HCC): Status: ACTIVE | Noted: 2021-02-04

## 2021-04-04 PROBLEM — I25.10 CORONARY ARTERY DISEASE INVOLVING NATIVE CORONARY ARTERY OF NATIVE HEART WITHOUT ANGINA PECTORIS: Status: ACTIVE | Noted: 2021-03-24

## 2021-04-08 ENCOUNTER — OFFICE VISIT (OUTPATIENT)
Dept: NEPHROLOGY | Facility: CLINIC | Age: 66
End: 2021-04-08

## 2021-04-08 ENCOUNTER — TELEPHONE (OUTPATIENT)
Dept: CARDIOLOGY | Age: 66
End: 2021-04-08

## 2021-04-08 VITALS — WEIGHT: 186.81 LBS | SYSTOLIC BLOOD PRESSURE: 136 MMHG | BODY MASS INDEX: 29 KG/M2 | DIASTOLIC BLOOD PRESSURE: 88 MMHG

## 2021-04-08 DIAGNOSIS — N17.9 AKI (ACUTE KIDNEY INJURY) (HCC): ICD-10-CM

## 2021-04-08 DIAGNOSIS — N18.30 STAGE 3 CHRONIC KIDNEY DISEASE, UNSPECIFIED WHETHER STAGE 3A OR 3B CKD (HCC): Primary | ICD-10-CM

## 2021-04-08 DIAGNOSIS — N17.0 ATN (ACUTE TUBULAR NECROSIS) (HCC): ICD-10-CM

## 2021-04-08 DIAGNOSIS — E11.21 DIABETIC NEPHROPATHY ASSOCIATED WITH TYPE 2 DIABETES MELLITUS (HCC): ICD-10-CM

## 2021-04-08 PROCEDURE — 3079F DIAST BP 80-89 MM HG: CPT | Performed by: INTERNAL MEDICINE

## 2021-04-08 PROCEDURE — 3075F SYST BP GE 130 - 139MM HG: CPT | Performed by: INTERNAL MEDICINE

## 2021-04-08 PROCEDURE — 99214 OFFICE O/P EST MOD 30 MIN: CPT | Performed by: INTERNAL MEDICINE

## 2021-04-08 NOTE — PROGRESS NOTES
Nephrology Progress Note      ASSESSMENT/PLAN:      1) Recent HUSSAIN- Cr peaked over 5 mg/dl due to contrast nephropathy (CTA + cardiac cath) + post-op fluid shifts / hypotension with partial recovery; no other acute insults noted.  Meds are benign; eval for o THORACENTESIS        Family History   Problem Relation Age of Onset   • Heart Disorder Father    • Hypertension Father    • Heart Disorder Mother       Social History: Social History    Tobacco Use      Smoking status: Never Smoker      Smokeless tobacco: or gross hematuria  Denies LE edema  Denies skin rashes/myalgias/arthralgias      PHYSICAL EXAM:   /88   Wt 186 lb 12.8 oz (84.7 kg)   BMI 28.83 kg/m²   Wt Readings from Last 3 Encounters:  04/08/21 : 186 lb 12.8 oz (84.7 kg)  03/12/21 : 183 lb (83 k

## 2021-04-09 NOTE — H&P
Progress Notes  - documented in this encounter  Chon Weaver CNP - 03/24/2021 3:00 PM CDT  Formatting of this note is different from the original.  Images from the original note were not included.     [address]  Cardiovascular Clinic Note arteries: Systolic pressure was mildly to moderately increased, estimated to be 46mm Hg.     Impressions:  No previous study was available for comparison     Coronary angiogram 2/8/2021  IMPRESSION:   1. Multivessel coronary artery disease with normal left Onset   • Stroke Mother   • Hypertension Father     Social Hx:  he reports that he has never smoked. He has never used smokeless tobacco. He reports previous alcohol use. He reports that he does not use drugs.   Allergies:    ALLERGIES:  No Known Allergies is clear. Eyes: Pupils are equal, round, and reactive to light. Neck: Normal range of motion. Neck supple. No JVD present. Cardiovascular: Normal rate, regular rhythm, S1 normal, S2 normal, normal heart sounds, intact distal pulses and normal pulses. call us with any questions.     Darrin Riley CNP  [facility] [place]    Electronically signed by Darrin Riley CNP at 03/24/2021 3:31 PM CDT

## 2021-04-12 ENCOUNTER — LAB ENCOUNTER (OUTPATIENT)
Dept: LAB | Age: 66
End: 2021-04-12
Attending: INTERNAL MEDICINE
Payer: COMMERCIAL

## 2021-04-12 ENCOUNTER — OFFICE VISIT (OUTPATIENT)
Dept: FAMILY MEDICINE CLINIC | Facility: CLINIC | Age: 66
End: 2021-04-12

## 2021-04-12 ENCOUNTER — LAB ENCOUNTER (OUTPATIENT)
Dept: LAB | Age: 66
End: 2021-04-12
Attending: EMERGENCY MEDICINE
Payer: COMMERCIAL

## 2021-04-12 VITALS
DIASTOLIC BLOOD PRESSURE: 84 MMHG | BODY MASS INDEX: 28.7 KG/M2 | HEART RATE: 73 BPM | OXYGEN SATURATION: 99 % | WEIGHT: 185 LBS | TEMPERATURE: 98 F | SYSTOLIC BLOOD PRESSURE: 138 MMHG | HEIGHT: 67.5 IN | RESPIRATION RATE: 15 BRPM

## 2021-04-12 DIAGNOSIS — R79.89 ELEVATED TSH: ICD-10-CM

## 2021-04-12 DIAGNOSIS — E11.9 TYPE 2 DIABETES MELLITUS WITHOUT COMPLICATION, WITHOUT LONG-TERM CURRENT USE OF INSULIN (HCC): ICD-10-CM

## 2021-04-12 DIAGNOSIS — I48.91 A-FIB (HCC): ICD-10-CM

## 2021-04-12 DIAGNOSIS — E11.65 UNCONTROLLED TYPE 2 DIABETES MELLITUS WITH HYPERGLYCEMIA (HCC): ICD-10-CM

## 2021-04-12 DIAGNOSIS — I48.91 ATRIAL FIBRILLATION WITH RVR (HCC): ICD-10-CM

## 2021-04-12 DIAGNOSIS — E87.6 HYPOKALEMIA: ICD-10-CM

## 2021-04-12 DIAGNOSIS — Z95.1 S/P CABG (CORONARY ARTERY BYPASS GRAFT): ICD-10-CM

## 2021-04-12 DIAGNOSIS — I25.10 CORONARY ARTERY DISEASE INVOLVING NATIVE CORONARY ARTERY OF NATIVE HEART WITHOUT ANGINA PECTORIS: ICD-10-CM

## 2021-04-12 DIAGNOSIS — Z00.00 ENCOUNTER FOR ANNUAL PHYSICAL EXAM: Primary | ICD-10-CM

## 2021-04-12 PROCEDURE — 84439 ASSAY OF FREE THYROXINE: CPT

## 2021-04-12 PROCEDURE — 82043 UR ALBUMIN QUANTITATIVE: CPT

## 2021-04-12 PROCEDURE — 99397 PER PM REEVAL EST PAT 65+ YR: CPT | Performed by: EMERGENCY MEDICINE

## 2021-04-12 PROCEDURE — 36415 COLL VENOUS BLD VENIPUNCTURE: CPT

## 2021-04-12 PROCEDURE — 1111F DSCHRG MED/CURRENT MED MERGE: CPT | Performed by: EMERGENCY MEDICINE

## 2021-04-12 PROCEDURE — 3075F SYST BP GE 130 - 139MM HG: CPT | Performed by: EMERGENCY MEDICINE

## 2021-04-12 PROCEDURE — 3060F POS MICROALBUMINURIA REV: CPT | Performed by: EMERGENCY MEDICINE

## 2021-04-12 PROCEDURE — 84132 ASSAY OF SERUM POTASSIUM: CPT

## 2021-04-12 PROCEDURE — 84443 ASSAY THYROID STIM HORMONE: CPT

## 2021-04-12 PROCEDURE — 3008F BODY MASS INDEX DOCD: CPT | Performed by: EMERGENCY MEDICINE

## 2021-04-12 PROCEDURE — 82570 ASSAY OF URINE CREATININE: CPT

## 2021-04-12 PROCEDURE — 3079F DIAST BP 80-89 MM HG: CPT | Performed by: EMERGENCY MEDICINE

## 2021-04-12 NOTE — PATIENT INSTRUCTIONS
Thank you for choosing [address] [place] [address] Group  To Do:  FOR INOCENTE Phan        1. Complete stool cards for Colon cancer screening  2. Continue follow up with cardiology  3.  Follow up with nurse visit for pneumonia shot and tetanus shot in 1 months than 120/80 mm Hg; yearly if your systolic blood pressure is 120 to 139 mm Hg, or your diastolic blood pressure reading is 80 to 89 mm Hg   Colorectal cancer All men in this age group Flexible sigmoidoscopy every 5 years, or colonoscopy every 10 years, or How often   Chickenpox (varicella) All men in this age group who have no record of this infection or vaccine 2 doses; second dose should be given at least 4 weeks after the first dose   Hepatitis A Men at increased risk for infection – talk with your healt information is not intended as a substitute for professional medical care. Always follow your healthcare professional's instructions.

## 2021-04-12 NOTE — PROGRESS NOTES
Chief Complaint:   Patient presents with:  Physical: Annual physical     HPI:   This is a 77year old male who present for a yearly annual exam    WELL-MALE EXAM     1. Age:   77   2. Have you had any of the following problems:         a.  High blood pr CATHERTERIZATION (PBP)  02/09/2021   • IR THORACENTESIS       Social History:  Social History    Tobacco Use      Smoking status: Never Smoker      Smokeless tobacco: Never Used    Vaping Use      Vaping Use: Never used    Alcohol use: Not Currently    Juan Answered      PHYSICAL EXAM:   /84   Pulse 73   Temp 97.5 °F (36.4 °C) (Temporal)   Resp 15   Ht 5' 7.5\" (1.715 m)   Wt 185 lb (83.9 kg)   SpO2 99%   BMI 28.55 kg/m²  Estimated body mass index is 28.55 kg/m² as calculated from the following:    Kameron Dr. Lenora Anthony          Well Man Exam  Well balanced diet recommended. Routine exercise recommended most days during the week. Wear sunscreen - SPF 15 or higher and reapply every 2 hours as needed. Wear seat belts and drive safely.   Schedule regular olga

## 2021-04-14 ENCOUNTER — HOSPITAL ENCOUNTER (OUTPATIENT)
Dept: INTERVENTIONAL RADIOLOGY/VASCULAR | Facility: HOSPITAL | Age: 66
Discharge: HOME | End: 2021-04-14
Attending: INTERNAL MEDICINE | Admitting: INTERNAL MEDICINE
Payer: COMMERCIAL

## 2021-04-14 VITALS
DIASTOLIC BLOOD PRESSURE: 66 MMHG | HEART RATE: 50 BPM | SYSTOLIC BLOOD PRESSURE: 118 MMHG | OXYGEN SATURATION: 100 % | RESPIRATION RATE: 16 BRPM | TEMPERATURE: 97 F | BODY MASS INDEX: 28.25 KG/M2 | WEIGHT: 180 LBS | HEIGHT: 67 IN

## 2021-04-14 DIAGNOSIS — I48.91 ATRIAL FIBRILLATION, UNSPECIFIED TYPE (HCC): ICD-10-CM

## 2021-04-14 DIAGNOSIS — I48.91 A-FIB (HCC): Primary | ICD-10-CM

## 2021-04-14 PROCEDURE — 5A2204Z RESTORATION OF CARDIAC RHYTHM, SINGLE: ICD-10-PCS | Performed by: INTERNAL MEDICINE

## 2021-04-14 PROCEDURE — 99152 MOD SED SAME PHYS/QHP 5/>YRS: CPT

## 2021-04-14 PROCEDURE — 92960 CARDIOVERSION ELECTRIC EXT: CPT

## 2021-04-14 PROCEDURE — 93010 ELECTROCARDIOGRAM REPORT: CPT | Performed by: INTERNAL MEDICINE

## 2021-04-14 PROCEDURE — 82962 GLUCOSE BLOOD TEST: CPT

## 2021-04-14 PROCEDURE — 93005 ELECTROCARDIOGRAM TRACING: CPT

## 2021-04-14 PROCEDURE — 92960 CARDIOVERSION ELECTRIC EXT: CPT | Performed by: INTERNAL MEDICINE

## 2021-04-14 RX ORDER — SODIUM CHLORIDE 9 MG/ML
INJECTION, SOLUTION INTRAVENOUS CONTINUOUS
Status: DISCONTINUED | OUTPATIENT
Start: 2021-04-14 | End: 2021-04-14

## 2021-04-14 RX ADMIN — Medication 60 MG: at 11:43:00

## 2021-04-14 NOTE — PROGRESS NOTES
Pt arrived to cath lab holding with caregiver at side. Pt arrived in AFib. A Successful cardioversion was done with Dr. Jenni Avalos at 150 joules insync. Pt given brevital 60mg IVP by Dr. Jenni Avalos prior to cardioversion. After, EKG done to confirm sinus sridhar.  Pt r

## 2021-04-14 NOTE — PROCEDURES
PROCEDURE(S) PERFORMED:    1. Cardioversion. 2.     Sedation     :  Jeremy Loo MD    ANESTHESIA:  IV sedation. Moderate conscious sedation for this procedure was administered and personally monitored from 1140 until 1145.      INDICATION:  Per

## 2021-04-27 NOTE — TELEPHONE ENCOUNTER
Insurance prefers 90 day supply on Branded products. Pended new script for Januvia for 90 day supply to preferred pharmacy.

## 2021-05-03 ENCOUNTER — TELEPHONE (OUTPATIENT)
Dept: FAMILY MEDICINE CLINIC | Facility: CLINIC | Age: 66
End: 2021-05-03

## 2021-05-03 DIAGNOSIS — E87.5 HYPERKALEMIA: Primary | ICD-10-CM

## 2021-05-03 NOTE — TELEPHONE ENCOUNTER
----- Message from Merle Mancilla MD sent at 5/3/2021 12:53 PM CDT -----  Irbesartan recently discontinued  Pls a have pt recheck K + in 1 week

## 2021-05-04 NOTE — TELEPHONE ENCOUNTER
Patient's daughter-in-law is not on verbal consent form. Patient is not at the house at this time to give consent. Patient's daughter-in-law states that she will call the office back when the patient comes home in order for him to give verbal consent.

## 2021-05-04 NOTE — TELEPHONE ENCOUNTER
Please call patient's daughter in law Abdirashid, she has some questions regarding Flora Guerrero, she states this has not been discontinued. Please Advise. Thank you.

## 2021-05-05 NOTE — TELEPHONE ENCOUNTER
Pt and daughter in law Flourene calling back. Pt gave verbal consent over phone for a nurse to spk to [facility].

## 2021-05-05 NOTE — TELEPHONE ENCOUNTER
Spoke with the patient's daughter-In-law via phone. Patient was instructed to hold irbesartan on 3/24/2021 by Jada Ernst until the patient had follow-up with Dr. Luis Carlos Jason. Patient had follow up with Dr. Luis Carlos Jason on 4/8/2021.  On 4/12/2021, patient was instructed

## 2021-05-06 NOTE — TELEPHONE ENCOUNTER
Pt needs to stop Irbesartan if taking  Repeat BMP in 1 week  Needs to follow up with Dr. Brandt Lofton for HUSSAIN

## 2021-05-10 ENCOUNTER — LAB ENCOUNTER (OUTPATIENT)
Dept: LAB | Age: 66
End: 2021-05-10
Attending: EMERGENCY MEDICINE
Payer: COMMERCIAL

## 2021-05-10 DIAGNOSIS — E87.5 HYPERKALEMIA: ICD-10-CM

## 2021-05-10 PROCEDURE — 80048 BASIC METABOLIC PNL TOTAL CA: CPT

## 2021-05-10 PROCEDURE — 36415 COLL VENOUS BLD VENIPUNCTURE: CPT

## 2021-05-12 ENCOUNTER — TELEPHONE (OUTPATIENT)
Dept: CARDIOLOGY | Age: 66
End: 2021-05-12

## 2021-05-12 ENCOUNTER — ANCILLARY PROCEDURE (OUTPATIENT)
Dept: CARDIOLOGY | Age: 66
End: 2021-05-12

## 2021-05-12 VITALS
HEIGHT: 68 IN | WEIGHT: 184 LBS | SYSTOLIC BLOOD PRESSURE: 135 MMHG | HEART RATE: 64 BPM | BODY MASS INDEX: 27.89 KG/M2 | DIASTOLIC BLOOD PRESSURE: 76 MMHG

## 2021-05-12 DIAGNOSIS — I48.92 ATRIAL FLUTTER, UNSPECIFIED TYPE (CMD): ICD-10-CM

## 2021-05-12 DIAGNOSIS — Z95.1 S/P CABG (CORONARY ARTERY BYPASS GRAFT): ICD-10-CM

## 2021-05-12 DIAGNOSIS — I48.91 ATRIAL FIBRILLATION STATUS POST CARDIOVERSION (CMD): Primary | ICD-10-CM

## 2021-05-12 DIAGNOSIS — E78.2 MIXED HYPERLIPIDEMIA: ICD-10-CM

## 2021-05-12 DIAGNOSIS — I25.10 CORONARY ARTERY DISEASE INVOLVING NATIVE CORONARY ARTERY OF NATIVE HEART WITHOUT ANGINA PECTORIS: ICD-10-CM

## 2021-05-12 DIAGNOSIS — I48.91 ATRIAL FIBRILLATION WITH RVR (CMD): ICD-10-CM

## 2021-05-12 DIAGNOSIS — I10 ESSENTIAL HYPERTENSION: ICD-10-CM

## 2021-05-12 DIAGNOSIS — E11.9 TYPE 2 DIABETES MELLITUS WITHOUT COMPLICATION, WITHOUT LONG-TERM CURRENT USE OF INSULIN (CMD): ICD-10-CM

## 2021-05-12 DIAGNOSIS — N18.30 STAGE 3 CHRONIC KIDNEY DISEASE, UNSPECIFIED WHETHER STAGE 3A OR 3B CKD (CMD): ICD-10-CM

## 2021-05-12 PROCEDURE — 99214 OFFICE O/P EST MOD 30 MIN: CPT | Performed by: NURSE PRACTITIONER

## 2021-05-12 PROCEDURE — 93000 ELECTROCARDIOGRAM COMPLETE: CPT | Performed by: NURSE PRACTITIONER

## 2021-05-12 RX ORDER — METOPROLOL SUCCINATE 50 MG/1
75 TABLET, EXTENDED RELEASE ORAL DAILY
Qty: 135 TABLET | Refills: 3 | Status: SHIPPED | OUTPATIENT
Start: 2021-05-12

## 2021-05-13 ENCOUNTER — TELEPHONE (OUTPATIENT)
Dept: FAMILY MEDICINE CLINIC | Facility: CLINIC | Age: 66
End: 2021-05-13

## 2021-05-13 NOTE — TELEPHONE ENCOUNTER
Patient is needing a refill of SITagliptin Phosphate (JANUVIA) 50 MG Oral Tab. Also, Pt saw Dr. Yumiko Hendricks who stopped his Irbesartan 300mg.

## 2021-05-14 ENCOUNTER — TELEPHONE (OUTPATIENT)
Dept: FAMILY MEDICINE CLINIC | Facility: CLINIC | Age: 66
End: 2021-05-14

## 2021-06-14 ENCOUNTER — TELEPHONE (OUTPATIENT)
Dept: CARDIOLOGY | Age: 66
End: 2021-06-14

## 2021-06-14 RX ORDER — APIXABAN 5 MG/1
5 TABLET, FILM COATED ORAL EVERY 12 HOURS SCHEDULED
Qty: 180 TABLET | Refills: 0 | Status: SHIPPED | OUTPATIENT
Start: 2021-06-14

## 2021-06-14 RX ORDER — AMIODARONE HYDROCHLORIDE 200 MG/1
200 TABLET ORAL DAILY
Qty: 90 TABLET | Refills: 0 | Status: SHIPPED | OUTPATIENT
Start: 2021-06-14

## 2021-06-14 RX ORDER — ATORVASTATIN CALCIUM 40 MG/1
40 TABLET, FILM COATED ORAL NIGHTLY
Qty: 90 TABLET | Refills: 1 | Status: SHIPPED | OUTPATIENT
Start: 2021-06-14

## 2021-06-15 ENCOUNTER — CARDPULM VISIT (OUTPATIENT)
Dept: CARDIAC REHAB | Facility: HOSPITAL | Age: 66
End: 2021-06-15
Attending: INTERNAL MEDICINE
Payer: COMMERCIAL

## 2021-06-15 VITALS — BODY MASS INDEX: 27.28 KG/M2 | WEIGHT: 180 LBS | HEIGHT: 68 IN

## 2021-06-15 PROCEDURE — 82962 GLUCOSE BLOOD TEST: CPT

## 2021-06-15 PROCEDURE — 93798 PHYS/QHP OP CAR RHAB W/ECG: CPT

## 2021-06-17 ENCOUNTER — TELEPHONE (OUTPATIENT)
Dept: FAMILY MEDICINE CLINIC | Facility: CLINIC | Age: 66
End: 2021-06-17

## 2021-06-17 ENCOUNTER — CARDPULM VISIT (OUTPATIENT)
Dept: CARDIAC REHAB | Facility: HOSPITAL | Age: 66
End: 2021-06-17
Attending: INTERNAL MEDICINE
Payer: COMMERCIAL

## 2021-06-17 DIAGNOSIS — E87.5 HYPERKALEMIA: Primary | ICD-10-CM

## 2021-06-17 PROCEDURE — 93798 PHYS/QHP OP CAR RHAB W/ECG: CPT

## 2021-06-17 RX ORDER — METOPROLOL SUCCINATE 50 MG/1
75 TABLET, EXTENDED RELEASE ORAL
Qty: 30 TABLET | Refills: 3 | COMMUNITY
Start: 2021-06-17

## 2021-06-17 NOTE — TELEPHONE ENCOUNTER
Gennaro Nielsen daughter-in-law, called along with Pt, they asked for an order for Potassium.  Please advise

## 2021-06-17 NOTE — TELEPHONE ENCOUNTER
Called to obtain additional information. Pt's daughter in law is requesting for a lab order to recheck pt's potassium level. Pt was seen by cardiology on 5/12 and was hold informed to hold Irbesartan 300 MG Oral Tablet and to recheck potassium level.  Caprice Alvarado

## 2021-06-17 NOTE — TELEPHONE ENCOUNTER
FYI: Called pt's daughter in law-Abdirashid and informed her of below response from Dr. Herson Mcbride. Abdirashid verbalized understanding but stated pt does not want to see more doctors and will go back to the [facility] soon.  Stressed the importance of following

## 2021-06-19 ENCOUNTER — CARDPULM VISIT (OUTPATIENT)
Dept: CARDIAC REHAB | Facility: HOSPITAL | Age: 66
End: 2021-06-19
Attending: INTERNAL MEDICINE
Payer: COMMERCIAL

## 2021-06-21 PROCEDURE — 93798 PHYS/QHP OP CAR RHAB W/ECG: CPT

## 2021-06-22 ENCOUNTER — CARDPULM VISIT (OUTPATIENT)
Dept: CARDIAC REHAB | Facility: HOSPITAL | Age: 66
End: 2021-06-22
Attending: INTERNAL MEDICINE
Payer: COMMERCIAL

## 2021-06-22 ENCOUNTER — LAB ENCOUNTER (OUTPATIENT)
Dept: LAB | Age: 66
End: 2021-06-22
Attending: EMERGENCY MEDICINE
Payer: COMMERCIAL

## 2021-06-22 DIAGNOSIS — E87.5 HYPERKALEMIA: ICD-10-CM

## 2021-06-22 PROCEDURE — 36415 COLL VENOUS BLD VENIPUNCTURE: CPT

## 2021-06-22 PROCEDURE — 80048 BASIC METABOLIC PNL TOTAL CA: CPT

## 2021-06-22 PROCEDURE — 93798 PHYS/QHP OP CAR RHAB W/ECG: CPT

## 2021-06-24 ENCOUNTER — TELEPHONE (OUTPATIENT)
Dept: FAMILY MEDICINE CLINIC | Facility: CLINIC | Age: 66
End: 2021-06-24

## 2021-06-24 ENCOUNTER — CARDPULM VISIT (OUTPATIENT)
Dept: CARDIAC REHAB | Facility: HOSPITAL | Age: 66
End: 2021-06-24
Attending: INTERNAL MEDICINE
Payer: COMMERCIAL

## 2021-06-24 PROCEDURE — 93798 PHYS/QHP OP CAR RHAB W/ECG: CPT

## 2021-06-24 NOTE — TELEPHONE ENCOUNTER
----- Message from Yi Knowles MD sent at 6/23/2021  6:13 PM CDT -----  K+ stable  Creatinine stable  Pt needs follow up with nephrology, Dr. Christ Medeiros

## 2021-06-29 ENCOUNTER — APPOINTMENT (OUTPATIENT)
Dept: CARDIAC REHAB | Facility: HOSPITAL | Age: 66
End: 2021-06-29
Attending: INTERNAL MEDICINE
Payer: COMMERCIAL

## 2021-07-01 ENCOUNTER — APPOINTMENT (OUTPATIENT)
Dept: CARDIAC REHAB | Facility: HOSPITAL | Age: 66
End: 2021-07-01
Attending: INTERNAL MEDICINE
Payer: COMMERCIAL

## 2021-07-06 ENCOUNTER — APPOINTMENT (OUTPATIENT)
Dept: CARDIAC REHAB | Facility: HOSPITAL | Age: 66
End: 2021-07-06
Attending: INTERNAL MEDICINE
Payer: COMMERCIAL

## 2021-07-06 PROCEDURE — 93798 PHYS/QHP OP CAR RHAB W/ECG: CPT

## 2021-07-08 ENCOUNTER — APPOINTMENT (OUTPATIENT)
Dept: CARDIAC REHAB | Facility: HOSPITAL | Age: 66
End: 2021-07-08
Attending: INTERNAL MEDICINE
Payer: COMMERCIAL

## 2021-07-08 PROCEDURE — 93798 PHYS/QHP OP CAR RHAB W/ECG: CPT

## 2021-07-13 ENCOUNTER — CARDPULM VISIT (OUTPATIENT)
Dept: CARDIAC REHAB | Facility: HOSPITAL | Age: 66
End: 2021-07-13
Attending: INTERNAL MEDICINE
Payer: COMMERCIAL

## 2021-07-13 PROCEDURE — 93798 PHYS/QHP OP CAR RHAB W/ECG: CPT

## 2021-07-15 ENCOUNTER — APPOINTMENT (OUTPATIENT)
Dept: CARDIAC REHAB | Facility: HOSPITAL | Age: 66
End: 2021-07-15
Attending: INTERNAL MEDICINE
Payer: COMMERCIAL

## 2021-07-15 PROCEDURE — 93798 PHYS/QHP OP CAR RHAB W/ECG: CPT

## 2021-07-20 ENCOUNTER — CARDPULM VISIT (OUTPATIENT)
Dept: CARDIAC REHAB | Facility: HOSPITAL | Age: 66
End: 2021-07-20
Attending: INTERNAL MEDICINE
Payer: COMMERCIAL

## 2021-07-20 PROCEDURE — 93798 PHYS/QHP OP CAR RHAB W/ECG: CPT

## 2021-07-22 ENCOUNTER — APPOINTMENT (OUTPATIENT)
Dept: CARDIAC REHAB | Facility: HOSPITAL | Age: 66
End: 2021-07-22
Attending: INTERNAL MEDICINE
Payer: COMMERCIAL

## 2021-07-22 PROCEDURE — 93798 PHYS/QHP OP CAR RHAB W/ECG: CPT

## 2021-07-23 RX ORDER — PEN NEEDLE, DIABETIC 32GX 5/32"
NEEDLE, DISPOSABLE MISCELLANEOUS
Qty: 50 EACH | Refills: 0 | Status: SHIPPED | OUTPATIENT
Start: 2021-07-23 | End: 2021-08-12 | Stop reason: ALTCHOICE

## 2021-07-27 ENCOUNTER — CARDPULM VISIT (OUTPATIENT)
Dept: CARDIAC REHAB | Facility: HOSPITAL | Age: 66
End: 2021-07-27
Attending: INTERNAL MEDICINE
Payer: COMMERCIAL

## 2021-07-27 PROCEDURE — 93798 PHYS/QHP OP CAR RHAB W/ECG: CPT

## 2021-07-29 ENCOUNTER — APPOINTMENT (OUTPATIENT)
Dept: CARDIAC REHAB | Facility: HOSPITAL | Age: 66
End: 2021-07-29
Attending: INTERNAL MEDICINE
Payer: COMMERCIAL

## 2021-08-03 ENCOUNTER — APPOINTMENT (OUTPATIENT)
Dept: CARDIAC REHAB | Facility: HOSPITAL | Age: 66
End: 2021-08-03
Attending: INTERNAL MEDICINE
Payer: COMMERCIAL

## 2021-08-05 ENCOUNTER — APPOINTMENT (OUTPATIENT)
Dept: CARDIAC REHAB | Facility: HOSPITAL | Age: 66
End: 2021-08-05
Attending: INTERNAL MEDICINE
Payer: COMMERCIAL

## 2021-08-10 ENCOUNTER — APPOINTMENT (OUTPATIENT)
Dept: CARDIAC REHAB | Facility: HOSPITAL | Age: 66
End: 2021-08-10
Attending: INTERNAL MEDICINE
Payer: COMMERCIAL

## 2021-08-10 PROBLEM — I48.91 ATRIAL FIBRILLATION STATUS POST CARDIOVERSION (HCC): Status: ACTIVE | Noted: 2021-02-04

## 2021-08-11 ENCOUNTER — TELEPHONE (OUTPATIENT)
Dept: FAMILY MEDICINE CLINIC | Facility: CLINIC | Age: 66
End: 2021-08-11

## 2021-08-11 DIAGNOSIS — Z11.52 ENCOUNTER FOR SCREENING FOR COVID-19: Primary | ICD-10-CM

## 2021-08-11 NOTE — TELEPHONE ENCOUNTER
I called all numbers on file for pt. Was only able to leave message on [phone]. PSR if patient calls back please let him know that COVID tet was ordered. Central scheduling [phone].

## 2021-08-11 NOTE — TELEPHONE ENCOUNTER
Chelsie Doe with [address] called, Pt came in needing an order for Covid screening due to travel. Pt wanted an order only. He is traveling on Friday and is needing to have the test done today. Please add an order and let Pt know when in.  Thank you

## 2021-08-12 ENCOUNTER — OFFICE VISIT (OUTPATIENT)
Dept: FAMILY MEDICINE CLINIC | Facility: CLINIC | Age: 66
End: 2021-08-12

## 2021-08-12 ENCOUNTER — APPOINTMENT (OUTPATIENT)
Dept: CARDIAC REHAB | Facility: HOSPITAL | Age: 66
End: 2021-08-12
Attending: INTERNAL MEDICINE
Payer: COMMERCIAL

## 2021-08-12 VITALS
DIASTOLIC BLOOD PRESSURE: 72 MMHG | OXYGEN SATURATION: 98 % | WEIGHT: 191 LBS | HEART RATE: 56 BPM | RESPIRATION RATE: 17 BRPM | SYSTOLIC BLOOD PRESSURE: 114 MMHG | BODY MASS INDEX: 29 KG/M2

## 2021-08-12 DIAGNOSIS — E11.9 TYPE 2 DIABETES MELLITUS WITHOUT COMPLICATION, WITHOUT LONG-TERM CURRENT USE OF INSULIN (HCC): Primary | ICD-10-CM

## 2021-08-12 PROCEDURE — 3078F DIAST BP <80 MM HG: CPT | Performed by: EMERGENCY MEDICINE

## 2021-08-12 PROCEDURE — 99214 OFFICE O/P EST MOD 30 MIN: CPT | Performed by: EMERGENCY MEDICINE

## 2021-08-12 PROCEDURE — 3074F SYST BP LT 130 MM HG: CPT | Performed by: EMERGENCY MEDICINE

## 2021-08-12 RX ORDER — AMLODIPINE BESYLATE 5 MG/1
5 TABLET ORAL DAILY
COMMUNITY
Start: 2021-08-10

## 2021-08-12 RX ORDER — PEN NEEDLE, DIABETIC 32GX 5/32"
1 NEEDLE, DISPOSABLE MISCELLANEOUS DAILY
Qty: 50 EACH | Refills: 0 | Status: CANCELLED | OUTPATIENT
Start: 2021-08-12

## 2021-08-12 NOTE — PROGRESS NOTES
Chief Complaint:   Patient presents with:  Diabetes: Med f/u    HPI:   This is a 77year old male         DIABETES    On Januvia and Levimir 10 units daily  Blood sugars 120-130  Trying to be mindful with diet. Denies any hypoglycemic episodes.   No rece 1 tablet (40 mg total) by mouth nightly., Disp: 30 tablet, Rfl: 3  apixaban 5 MG Oral Tab, Take 1 tablet (5 mg total) by mouth 2 (two) times daily.  May start 2/18/21 at evening dose., Disp: 60 tablet, Rfl: 3  Lancets Ultra Fine Does not apply Misc, 1 each complication, without long-term current use of insulin (HCC)  - COMP METABOLIC PANEL (14); Future  - HEMOGLOBIN A1C; Future  - LIPID PANEL; Future  - SITagliptin Phosphate (JANUVIA) 100 MG Oral Tab; Take 1 tablet (100 mg total) by mouth daily.   Dispense: 9

## 2021-08-12 NOTE — PATIENT INSTRUCTIONS
Thank you for choosing [facility]  To Do:  FOR INOCENTE Larsen        1. Complete stool cards for colon cancer screening  2. Increase Januvia to 100 mg daily  3. Ok to stop Levimir Insulin  4. Daily Blood sugar checks  5.  Follow up in 3 m total number of steps you took. Use a pedometer to set daily goals for yourself. For instance, if you walk 4,000 steps a day, try adding 200 more steps each day. Aim for a goal of 7,500.  With every step, you’re doing a little more to help your body use ins how activity affects blood sugar    Physical activity is important when you have diabetes. But you need to keep an eye on your blood sugar level.  Check often if you have been active for longer than usual. Also check your blood sugar if the activity was unp can cause serious problems over time if you don't get treated.  These problems include:   · Heart disease  · Stroke  · Kidney failure  · Blindness  · Nerve pain  · Loss of feeling in the legs and feet  · Tissue death (gangrene)  By keeping your blood sugar above normal or high  ? You have been vomiting more than 6 hours  ? You have trouble breathing or your breath has a fruity smell  ? You have a high fever  ? You have a fever for several days and you are not getting better  ?  You get light-headed and are sl above), eat a snack or meal to keep your blood sugar in a safe range. If it stays low, call your doctor or go to an emergency room.    If you have had severe low blood sugar episodes, see that someone in your family is trained to give you a shot of glucagon arterial disease (PAD). This is a disease of arteries in the legs. If you have PAD, arteries in other parts of your body are likely diseased, too. That puts you at high risk for other serious health problems.  Read on to learn how diabetes can lead to PAD a professional's instructions.

## 2021-08-17 ENCOUNTER — APPOINTMENT (OUTPATIENT)
Dept: CARDIAC REHAB | Facility: HOSPITAL | Age: 66
End: 2021-08-17
Attending: INTERNAL MEDICINE
Payer: COMMERCIAL

## 2021-08-19 ENCOUNTER — APPOINTMENT (OUTPATIENT)
Dept: CARDIAC REHAB | Facility: HOSPITAL | Age: 66
End: 2021-08-19
Attending: INTERNAL MEDICINE
Payer: COMMERCIAL

## 2021-08-24 ENCOUNTER — APPOINTMENT (OUTPATIENT)
Dept: CARDIAC REHAB | Facility: HOSPITAL | Age: 66
End: 2021-08-24
Attending: INTERNAL MEDICINE
Payer: COMMERCIAL

## 2021-08-26 ENCOUNTER — APPOINTMENT (OUTPATIENT)
Dept: CARDIAC REHAB | Facility: HOSPITAL | Age: 66
End: 2021-08-26
Attending: INTERNAL MEDICINE
Payer: COMMERCIAL

## 2021-08-31 ENCOUNTER — APPOINTMENT (OUTPATIENT)
Dept: CARDIAC REHAB | Facility: HOSPITAL | Age: 66
End: 2021-08-31
Attending: INTERNAL MEDICINE
Payer: COMMERCIAL

## 2021-09-02 ENCOUNTER — APPOINTMENT (OUTPATIENT)
Dept: CARDIAC REHAB | Facility: HOSPITAL | Age: 66
End: 2021-09-02
Attending: INTERNAL MEDICINE
Payer: COMMERCIAL

## 2021-09-07 ENCOUNTER — APPOINTMENT (OUTPATIENT)
Dept: CARDIAC REHAB | Facility: HOSPITAL | Age: 66
End: 2021-09-07
Attending: INTERNAL MEDICINE
Payer: COMMERCIAL

## 2022-09-20 ENCOUNTER — OFFICE VISIT (OUTPATIENT)
Dept: FAMILY MEDICINE CLINIC | Facility: CLINIC | Age: 67
End: 2022-09-20

## 2022-09-20 VITALS
WEIGHT: 187 LBS | SYSTOLIC BLOOD PRESSURE: 110 MMHG | HEART RATE: 96 BPM | BODY MASS INDEX: 29.35 KG/M2 | RESPIRATION RATE: 16 BRPM | OXYGEN SATURATION: 99 % | DIASTOLIC BLOOD PRESSURE: 60 MMHG | HEIGHT: 67 IN

## 2022-09-20 DIAGNOSIS — Z12.5 SCREENING FOR PROSTATE CANCER: ICD-10-CM

## 2022-09-20 DIAGNOSIS — I15.2 HYPERTENSION ASSOCIATED WITH DIABETES (HCC): ICD-10-CM

## 2022-09-20 DIAGNOSIS — E11.59 HYPERTENSION ASSOCIATED WITH DIABETES (HCC): ICD-10-CM

## 2022-09-20 DIAGNOSIS — I10 ESSENTIAL HYPERTENSION: ICD-10-CM

## 2022-09-20 DIAGNOSIS — I48.91 ATRIAL FIBRILLATION WITH RVR (HCC): ICD-10-CM

## 2022-09-20 DIAGNOSIS — Z00.00 LABORATORY EXAM ORDERED AS PART OF ROUTINE GENERAL MEDICAL EXAMINATION: ICD-10-CM

## 2022-09-20 DIAGNOSIS — E11.9 TYPE 2 DIABETES MELLITUS WITHOUT COMPLICATION, WITHOUT LONG-TERM CURRENT USE OF INSULIN (HCC): Primary | ICD-10-CM

## 2022-09-20 LAB
CARTRIDGE LOT#: 989 NUMERIC
HEMOGLOBIN A1C: 7.6 % (ref 4.3–5.6)

## 2022-09-20 PROCEDURE — 83036 HEMOGLOBIN GLYCOSYLATED A1C: CPT | Performed by: EMERGENCY MEDICINE

## 2022-09-20 PROCEDURE — 99214 OFFICE O/P EST MOD 30 MIN: CPT | Performed by: EMERGENCY MEDICINE

## 2022-09-20 RX ORDER — METOPROLOL SUCCINATE 50 MG/1
75 TABLET, EXTENDED RELEASE ORAL
Qty: 30 TABLET | Refills: 3 | Status: CANCELLED | OUTPATIENT
Start: 2022-09-20

## 2022-09-20 RX ORDER — ATORVASTATIN CALCIUM 40 MG/1
40 TABLET, FILM COATED ORAL NIGHTLY
Qty: 30 TABLET | Refills: 3 | Status: CANCELLED | OUTPATIENT
Start: 2022-09-20

## 2022-09-20 RX ORDER — AMIODARONE HYDROCHLORIDE 200 MG/1
200 TABLET ORAL DAILY
Qty: 30 TABLET | Refills: 2 | Status: SHIPPED | OUTPATIENT
Start: 2022-09-20

## 2022-09-20 RX ORDER — ATORVASTATIN CALCIUM 40 MG/1
40 TABLET, FILM COATED ORAL NIGHTLY
Qty: 30 TABLET | Refills: 2 | Status: SHIPPED | OUTPATIENT
Start: 2022-09-20

## 2022-09-20 RX ORDER — METOPROLOL SUCCINATE 50 MG/1
75 TABLET, EXTENDED RELEASE ORAL
Qty: 45 TABLET | Refills: 2 | Status: SHIPPED | OUTPATIENT
Start: 2022-09-20 | End: 2022-10-20

## 2022-09-29 ENCOUNTER — TELEPHONE (OUTPATIENT)
Dept: FAMILY MEDICINE CLINIC | Facility: CLINIC | Age: 67
End: 2022-09-29

## 2022-10-17 ENCOUNTER — MED REC SCAN ONLY (OUTPATIENT)
Dept: FAMILY MEDICINE CLINIC | Facility: CLINIC | Age: 67
End: 2022-10-17

## 2022-10-21 ENCOUNTER — TELEPHONE (OUTPATIENT)
Dept: FAMILY MEDICINE CLINIC | Facility: CLINIC | Age: 67
End: 2022-10-21

## 2022-11-10 ENCOUNTER — TELEPHONE (OUTPATIENT)
Dept: FAMILY MEDICINE CLINIC | Facility: CLINIC | Age: 67
End: 2022-11-10

## 2022-11-10 NOTE — TELEPHONE ENCOUNTER
Pt dropped off forms for [employer] [facility]. Copy placed in bin and form placed in dr folder.      Please call pt when form is ready for   [phone]

## 2022-11-10 NOTE — TELEPHONE ENCOUNTER
Pt dropped off forms for [facility] [facility]. Okay to complete? Thank you.    LOV: 09/20/22

## 2022-11-11 NOTE — TELEPHONE ENCOUNTER
Form signed, updated. Copy sent to scanning, original placed up front for p/u. LVM for pt to p/u form anytime today or tmrrw.

## 2023-01-24 DIAGNOSIS — I48.91 ATRIAL FIBRILLATION WITH RVR (HCC): ICD-10-CM

## 2023-01-24 DIAGNOSIS — E11.9 TYPE 2 DIABETES MELLITUS WITHOUT COMPLICATION, WITHOUT LONG-TERM CURRENT USE OF INSULIN (HCC): ICD-10-CM

## 2023-02-05 RX ORDER — ATORVASTATIN CALCIUM 40 MG/1
40 TABLET, FILM COATED ORAL NIGHTLY
Qty: 30 TABLET | Refills: 0 | Status: SHIPPED | OUTPATIENT
Start: 2023-02-05

## 2023-02-05 RX ORDER — AMIODARONE HYDROCHLORIDE 200 MG/1
200 TABLET ORAL DAILY
Qty: 30 TABLET | Refills: 0 | Status: SHIPPED | OUTPATIENT
Start: 2023-02-05

## 2023-02-21 DIAGNOSIS — E11.9 TYPE 2 DIABETES MELLITUS WITHOUT COMPLICATION, WITHOUT LONG-TERM CURRENT USE OF INSULIN (HCC): ICD-10-CM

## 2023-02-21 DIAGNOSIS — I48.91 ATRIAL FIBRILLATION WITH RVR (HCC): ICD-10-CM

## 2023-02-21 NOTE — TELEPHONE ENCOUNTER
Requesting refill on atorvastatin, amiodarone, Januvia, and apixaban. LOV 9/20/2022. Appointment scheduled for 3/4/2023. Please approve or deny pending rx. Thank you.

## 2023-02-21 NOTE — TELEPHONE ENCOUNTER
Pt has an appointment scheduled for March 4th and is requesting a gap refill on all his current  Medications.  Pt is out of them after today    atorvastatin 40 MG Oral Tab  amiodarone 200 MG Oral Tab  SITagliptin Phosphate (JANUVIA) 100 MG Oral Tab  apixaban 5 MG Oral Tab

## 2023-02-24 RX ORDER — ATORVASTATIN CALCIUM 40 MG/1
40 TABLET, FILM COATED ORAL NIGHTLY
Qty: 30 TABLET | Refills: 0 | Status: SHIPPED | OUTPATIENT
Start: 2023-02-24

## 2023-02-24 RX ORDER — AMIODARONE HYDROCHLORIDE 200 MG/1
200 TABLET ORAL DAILY
Qty: 30 TABLET | Refills: 0 | Status: SHIPPED | OUTPATIENT
Start: 2023-02-24

## 2023-03-04 ENCOUNTER — OFFICE VISIT (OUTPATIENT)
Dept: FAMILY MEDICINE CLINIC | Facility: CLINIC | Age: 68
End: 2023-03-04
Payer: COMMERCIAL

## 2023-03-04 VITALS
BODY MASS INDEX: 30.92 KG/M2 | OXYGEN SATURATION: 99 % | RESPIRATION RATE: 24 BRPM | HEART RATE: 83 BPM | HEIGHT: 67 IN | WEIGHT: 197 LBS

## 2023-03-04 DIAGNOSIS — Z23 NEED FOR TDAP VACCINATION: ICD-10-CM

## 2023-03-04 DIAGNOSIS — Z12.5 SCREENING FOR PROSTATE CANCER: ICD-10-CM

## 2023-03-04 DIAGNOSIS — I48.91 ATRIAL FIBRILLATION WITH RVR (HCC): ICD-10-CM

## 2023-03-04 DIAGNOSIS — Z00.00 ENCOUNTER FOR ANNUAL PHYSICAL EXAM: Primary | ICD-10-CM

## 2023-03-04 DIAGNOSIS — E11.9 TYPE 2 DIABETES MELLITUS WITHOUT COMPLICATION, WITHOUT LONG-TERM CURRENT USE OF INSULIN (HCC): ICD-10-CM

## 2023-03-04 DIAGNOSIS — E11.59 HYPERTENSION ASSOCIATED WITH DIABETES (HCC): ICD-10-CM

## 2023-03-04 DIAGNOSIS — I25.10 CORONARY ARTERY DISEASE INVOLVING NATIVE CORONARY ARTERY OF NATIVE HEART WITHOUT ANGINA PECTORIS: ICD-10-CM

## 2023-03-04 DIAGNOSIS — Z12.11 SCREENING FOR COLON CANCER: ICD-10-CM

## 2023-03-04 DIAGNOSIS — Z00.00 LABORATORY EXAMINATION ORDERED AS PART OF A ROUTINE GENERAL MEDICAL EXAMINATION: ICD-10-CM

## 2023-03-04 DIAGNOSIS — I15.2 HYPERTENSION ASSOCIATED WITH DIABETES (HCC): ICD-10-CM

## 2023-03-04 LAB
CARTRIDGE LOT#: 30 NUMERIC
HEMOGLOBIN A1C: 8.6 % (ref 4.3–5.6)

## 2023-03-04 PROCEDURE — 3052F HG A1C>EQUAL 8.0%<EQUAL 9.0%: CPT | Performed by: EMERGENCY MEDICINE

## 2023-03-04 PROCEDURE — 90715 TDAP VACCINE 7 YRS/> IM: CPT | Performed by: EMERGENCY MEDICINE

## 2023-03-04 PROCEDURE — 99214 OFFICE O/P EST MOD 30 MIN: CPT | Performed by: EMERGENCY MEDICINE

## 2023-03-04 PROCEDURE — 3008F BODY MASS INDEX DOCD: CPT | Performed by: EMERGENCY MEDICINE

## 2023-03-04 PROCEDURE — 99397 PER PM REEVAL EST PAT 65+ YR: CPT | Performed by: EMERGENCY MEDICINE

## 2023-03-04 PROCEDURE — 83036 HEMOGLOBIN GLYCOSYLATED A1C: CPT | Performed by: EMERGENCY MEDICINE

## 2023-03-04 PROCEDURE — 90471 IMMUNIZATION ADMIN: CPT | Performed by: EMERGENCY MEDICINE

## 2023-03-04 RX ORDER — METOPROLOL SUCCINATE 50 MG/1
50 TABLET, EXTENDED RELEASE ORAL
Refills: 1 | Status: CANCELLED | OUTPATIENT
Start: 2023-03-04 | End: 2023-04-03

## 2023-03-04 RX ORDER — METOPROLOL SUCCINATE 50 MG/1
75 TABLET, EXTENDED RELEASE ORAL
Qty: 30 TABLET | Refills: 0 | Status: SHIPPED | OUTPATIENT
Start: 2023-03-04

## 2023-03-04 RX ORDER — EMPAGLIFLOZIN 25 MG/1
25 TABLET, FILM COATED ORAL EVERY MORNING
Qty: 30 TABLET | Refills: 0 | Status: SHIPPED | OUTPATIENT
Start: 2023-03-04 | End: 2023-04-03

## 2023-03-04 RX ORDER — ATORVASTATIN CALCIUM 40 MG/1
40 TABLET, FILM COATED ORAL NIGHTLY
Qty: 90 TABLET | Refills: 1 | Status: SHIPPED | OUTPATIENT
Start: 2023-03-04

## 2023-03-04 RX ORDER — AMIODARONE HYDROCHLORIDE 200 MG/1
200 TABLET ORAL DAILY
Qty: 90 TABLET | Refills: 0 | Status: SHIPPED | OUTPATIENT
Start: 2023-03-04

## 2023-03-04 NOTE — PATIENT INSTRUCTIONS
Thank you for choosing [facility]  To Do:  FOR [address] with Januvia, Atorvastatin, Amniodarone, Eliquis  Restart Metoprolol for blood pressure  Monitor Blood pressure daily and record  Need to lose weight, diabetes is uncontrolled  Diabetic diet  Start new medication Abraham Crews for diabetes  Have blood tests done today  Follow up in 2 weeks  Daily blood sugar checks and record. Bring to next visit  Follow up with Cardiology, Dr. Kaelyn Diane for diabetic eye exam, Dr. Joelyn Peabody  Complete stool cards for Colon cancer screening      Dr. Abbe Dickens. [facility]. [address]  [facility]    Phone: [phone]  OCZ:[phone]. WWW. [facility]

## 2023-03-24 ENCOUNTER — LAB ENCOUNTER (OUTPATIENT)
Dept: LAB | Age: 68
End: 2023-03-24
Attending: EMERGENCY MEDICINE
Payer: COMMERCIAL

## 2023-03-24 ENCOUNTER — OFFICE VISIT (OUTPATIENT)
Dept: FAMILY MEDICINE CLINIC | Facility: CLINIC | Age: 68
End: 2023-03-24
Payer: COMMERCIAL

## 2023-03-24 VITALS
WEIGHT: 196 LBS | BODY MASS INDEX: 30.76 KG/M2 | HEIGHT: 67 IN | DIASTOLIC BLOOD PRESSURE: 85 MMHG | HEART RATE: 81 BPM | OXYGEN SATURATION: 98 % | SYSTOLIC BLOOD PRESSURE: 135 MMHG | RESPIRATION RATE: 25 BRPM

## 2023-03-24 DIAGNOSIS — E11.9 TYPE 2 DIABETES MELLITUS WITHOUT COMPLICATION, WITHOUT LONG-TERM CURRENT USE OF INSULIN (HCC): ICD-10-CM

## 2023-03-24 DIAGNOSIS — I10 ESSENTIAL HYPERTENSION: ICD-10-CM

## 2023-03-24 DIAGNOSIS — Z12.5 SCREENING FOR PROSTATE CANCER: ICD-10-CM

## 2023-03-24 DIAGNOSIS — Z00.00 LABORATORY EXAMINATION ORDERED AS PART OF A ROUTINE GENERAL MEDICAL EXAMINATION: ICD-10-CM

## 2023-03-24 DIAGNOSIS — E11.65 UNCONTROLLED TYPE 2 DIABETES MELLITUS WITH HYPERGLYCEMIA (HCC): Primary | ICD-10-CM

## 2023-03-24 LAB
ALBUMIN SERPL-MCNC: 3.6 G/DL (ref 3.4–5)
ALBUMIN/GLOB SERPL: 0.9 {RATIO} (ref 1–2)
ALP LIVER SERPL-CCNC: 85 U/L
ALT SERPL-CCNC: 43 U/L
ANION GAP SERPL CALC-SCNC: 4 MMOL/L (ref 0–18)
AST SERPL-CCNC: 31 U/L (ref 15–37)
BASOPHILS # BLD AUTO: 0.06 X10(3) UL (ref 0–0.2)
BASOPHILS NFR BLD AUTO: 0.9 %
BILIRUB SERPL-MCNC: 0.7 MG/DL (ref 0.1–2)
BUN BLD-MCNC: 46 MG/DL (ref 7–18)
CALCIUM BLD-MCNC: 8.6 MG/DL (ref 8.5–10.1)
CHLORIDE SERPL-SCNC: 107 MMOL/L (ref 98–112)
CO2 SERPL-SCNC: 25 MMOL/L (ref 21–32)
COMPLEXED PSA SERPL-MCNC: 1.88 NG/ML (ref ?–4)
CREAT BLD-MCNC: 2.28 MG/DL
EOSINOPHIL # BLD AUTO: 0.45 X10(3) UL (ref 0–0.7)
EOSINOPHIL NFR BLD AUTO: 6.6 %
ERYTHROCYTE [DISTWIDTH] IN BLOOD BY AUTOMATED COUNT: 13.1 %
FASTING STATUS PATIENT QL REPORTED: NO
GFR SERPLBLD BASED ON 1.73 SQ M-ARVRAT: 31 ML/MIN/1.73M2 (ref 60–?)
GLOBULIN PLAS-MCNC: 4.2 G/DL (ref 2.8–4.4)
GLUCOSE BLD-MCNC: 155 MG/DL (ref 70–99)
HCT VFR BLD AUTO: 49.3 %
HGB BLD-MCNC: 15.9 G/DL
IMM GRANULOCYTES # BLD AUTO: 0.02 X10(3) UL (ref 0–1)
IMM GRANULOCYTES NFR BLD: 0.3 %
LYMPHOCYTES # BLD AUTO: 1.6 X10(3) UL (ref 1–4)
LYMPHOCYTES NFR BLD AUTO: 23.6 %
MCH RBC QN AUTO: 27.1 PG (ref 26–34)
MCHC RBC AUTO-ENTMCNC: 32.3 G/DL (ref 31–37)
MCV RBC AUTO: 84.1 FL
MONOCYTES # BLD AUTO: 0.65 X10(3) UL (ref 0.1–1)
MONOCYTES NFR BLD AUTO: 9.6 %
NEUTROPHILS # BLD AUTO: 3.99 X10 (3) UL (ref 1.5–7.7)
NEUTROPHILS # BLD AUTO: 3.99 X10(3) UL (ref 1.5–7.7)
NEUTROPHILS NFR BLD AUTO: 59 %
OSMOLALITY SERPL CALC.SUM OF ELEC: 297 MOSM/KG (ref 275–295)
PLATELET # BLD AUTO: 200 10(3)UL (ref 150–450)
POTASSIUM SERPL-SCNC: 5.5 MMOL/L (ref 3.5–5.1)
PROT SERPL-MCNC: 7.8 G/DL (ref 6.4–8.2)
RBC # BLD AUTO: 5.86 X10(6)UL
SODIUM SERPL-SCNC: 136 MMOL/L (ref 136–145)
T4 FREE SERPL-MCNC: 1 NG/DL (ref 0.8–1.7)
TSI SER-ACNC: 5.62 MIU/ML (ref 0.36–3.74)
WBC # BLD AUTO: 6.8 X10(3) UL (ref 4–11)

## 2023-03-24 PROCEDURE — 3008F BODY MASS INDEX DOCD: CPT | Performed by: EMERGENCY MEDICINE

## 2023-03-24 PROCEDURE — 3046F HEMOGLOBIN A1C LEVEL >9.0%: CPT | Performed by: EMERGENCY MEDICINE

## 2023-03-24 PROCEDURE — 99214 OFFICE O/P EST MOD 30 MIN: CPT | Performed by: EMERGENCY MEDICINE

## 2023-03-24 PROCEDURE — 84153 ASSAY OF PSA TOTAL: CPT | Performed by: EMERGENCY MEDICINE

## 2023-03-24 PROCEDURE — 83036 HEMOGLOBIN GLYCOSYLATED A1C: CPT | Performed by: EMERGENCY MEDICINE

## 2023-03-24 PROCEDURE — 3079F DIAST BP 80-89 MM HG: CPT | Performed by: EMERGENCY MEDICINE

## 2023-03-24 PROCEDURE — 80050 GENERAL HEALTH PANEL: CPT | Performed by: EMERGENCY MEDICINE

## 2023-03-24 PROCEDURE — 3075F SYST BP GE 130 - 139MM HG: CPT | Performed by: EMERGENCY MEDICINE

## 2023-03-24 PROCEDURE — 84439 ASSAY OF FREE THYROXINE: CPT | Performed by: EMERGENCY MEDICINE

## 2023-03-24 NOTE — PATIENT INSTRUCTIONS
Thank you for choosing [facility]  To Do:  FOR [facility]    Follow up in June after returning from the [address] all medications  Check blood sugars 2 x a day and record  Arrange for diabetic eye exam

## 2023-03-25 DIAGNOSIS — E11.59 HYPERTENSION ASSOCIATED WITH DIABETES (HCC): ICD-10-CM

## 2023-03-25 DIAGNOSIS — E11.9 TYPE 2 DIABETES MELLITUS WITHOUT COMPLICATION, WITHOUT LONG-TERM CURRENT USE OF INSULIN (HCC): ICD-10-CM

## 2023-03-25 DIAGNOSIS — I15.2 HYPERTENSION ASSOCIATED WITH DIABETES (HCC): ICD-10-CM

## 2023-03-25 DIAGNOSIS — I25.10 CORONARY ARTERY DISEASE INVOLVING NATIVE CORONARY ARTERY OF NATIVE HEART WITHOUT ANGINA PECTORIS: ICD-10-CM

## 2023-03-25 LAB
EST. AVERAGE GLUCOSE BLD GHB EST-MCNC: 226 MG/DL (ref 68–126)
HBA1C MFR BLD: 9.5 % (ref ?–5.7)

## 2023-03-27 ENCOUNTER — TELEPHONE (OUTPATIENT)
Dept: FAMILY MEDICINE CLINIC | Facility: CLINIC | Age: 68
End: 2023-03-27

## 2023-03-27 DIAGNOSIS — R79.89 ELEVATED SERUM CREATININE: Primary | ICD-10-CM

## 2023-03-27 NOTE — TELEPHONE ENCOUNTER
----- Message from Barby Toledo MD sent at 3/27/2023  4:32 PM CDT -----  Creatinine elevated  Pt needs to stop Jardiance  Pls order a urinalysis for patient before he leaves for the [facility]  Pt to follow up as soon as he returns from the [address] [place] [address] a repeat BMP in the [facility] with physician if he is able to.

## 2023-03-27 NOTE — TELEPHONE ENCOUNTER
Message left on home & cell VM for pt to call office back. Please lync triage when pt calls. Thank you.

## 2023-03-28 NOTE — TELEPHONE ENCOUNTER
Attempted to call the patient. Call went straight to voicemail. Left message to call the office back.

## 2023-03-28 NOTE — TELEPHONE ENCOUNTER
Attempted to call preferred number on HIPAA form. Patient's daughter-in-law answered the phone and stated that she is at work at this time. Recommended calling the patient's wife's phone. Called patient's wife's phone. Left message for patient to call the office back.

## 2023-03-30 ENCOUNTER — LAB ENCOUNTER (OUTPATIENT)
Dept: LAB | Age: 68
End: 2023-03-30
Attending: EMERGENCY MEDICINE
Payer: COMMERCIAL

## 2023-03-30 DIAGNOSIS — R79.89 ELEVATED SERUM CREATININE: ICD-10-CM

## 2023-03-30 LAB
BILIRUB UR QL STRIP.AUTO: NEGATIVE
CLARITY UR REFRACT.AUTO: CLEAR
GLUCOSE UR STRIP.AUTO-MCNC: >=500 MG/DL
KETONES UR STRIP.AUTO-MCNC: NEGATIVE MG/DL
LEUKOCYTE ESTERASE UR QL STRIP.AUTO: NEGATIVE
NITRITE UR QL STRIP.AUTO: NEGATIVE
PH UR STRIP.AUTO: 5 [PH] (ref 5–8)
PROT UR STRIP.AUTO-MCNC: 100 MG/DL
SP GR UR STRIP.AUTO: 1.01 (ref 1–1.03)
UROBILINOGEN UR STRIP.AUTO-MCNC: <2 MG/DL

## 2023-03-30 PROCEDURE — 81001 URINALYSIS AUTO W/SCOPE: CPT | Performed by: EMERGENCY MEDICINE

## 2023-03-30 RX ORDER — EMPAGLIFLOZIN 25 MG/1
TABLET, FILM COATED ORAL
Qty: 90 TABLET | Refills: 1 | Status: SHIPPED | OUTPATIENT
Start: 2023-03-30 | End: 2023-03-30 | Stop reason: CLARIF

## 2023-03-30 RX ORDER — METOPROLOL SUCCINATE 50 MG/1
TABLET, EXTENDED RELEASE ORAL
Qty: 90 TABLET | Refills: 1 | Status: SHIPPED | OUTPATIENT
Start: 2023-03-30

## 2023-03-31 NOTE — TELEPHONE ENCOUNTER
Patient has not read [employer] message. Attempted to call patient. Phone call went straight to voicemail.

## 2023-04-05 NOTE — TELEPHONE ENCOUNTER
Called pt but call went straight to . Left  stating letter will be sent to home. Pt had urinalysis completed on 03/30/23. No recommendations from provider yet received.

## 2023-06-26 ENCOUNTER — OFFICE VISIT (OUTPATIENT)
Dept: FAMILY MEDICINE CLINIC | Facility: CLINIC | Age: 68
End: 2023-06-26
Payer: COMMERCIAL

## 2023-06-26 VITALS
BODY MASS INDEX: 30.76 KG/M2 | HEIGHT: 67 IN | RESPIRATION RATE: 19 BRPM | SYSTOLIC BLOOD PRESSURE: 138 MMHG | DIASTOLIC BLOOD PRESSURE: 88 MMHG | OXYGEN SATURATION: 99 % | HEART RATE: 70 BPM | WEIGHT: 196 LBS

## 2023-06-26 DIAGNOSIS — I25.10 CORONARY ARTERY DISEASE INVOLVING NATIVE CORONARY ARTERY OF NATIVE HEART WITHOUT ANGINA PECTORIS: ICD-10-CM

## 2023-06-26 DIAGNOSIS — E11.69 TYPE 2 DIABETES MELLITUS WITH OTHER SPECIFIED COMPLICATION, WITHOUT LONG-TERM CURRENT USE OF INSULIN (HCC): ICD-10-CM

## 2023-06-26 DIAGNOSIS — I48.91 ATRIAL FIBRILLATION WITH RVR (HCC): ICD-10-CM

## 2023-06-26 DIAGNOSIS — E11.59 HYPERTENSION ASSOCIATED WITH DIABETES (HCC): ICD-10-CM

## 2023-06-26 DIAGNOSIS — E11.65 UNCONTROLLED TYPE 2 DIABETES MELLITUS WITH HYPERGLYCEMIA (HCC): Primary | ICD-10-CM

## 2023-06-26 DIAGNOSIS — E78.5 DYSLIPIDEMIA: ICD-10-CM

## 2023-06-26 DIAGNOSIS — I15.2 HYPERTENSION ASSOCIATED WITH DIABETES (HCC): ICD-10-CM

## 2023-06-26 LAB
CARTRIDGE LOT#: 56 NUMERIC
HEMOGLOBIN A1C: 9.7 % (ref 4.3–5.6)

## 2023-06-26 RX ORDER — AMIODARONE HYDROCHLORIDE 200 MG/1
200 TABLET ORAL DAILY
Qty: 90 TABLET | Refills: 1 | Status: CANCELLED | OUTPATIENT
Start: 2023-06-26

## 2023-06-26 RX ORDER — ATORVASTATIN CALCIUM 40 MG/1
40 TABLET, FILM COATED ORAL NIGHTLY
Qty: 90 TABLET | Refills: 1 | Status: SHIPPED | OUTPATIENT
Start: 2023-06-26

## 2023-06-26 RX ORDER — TIRZEPATIDE 2.5 MG/.5ML
2.5 INJECTION, SOLUTION SUBCUTANEOUS WEEKLY
Qty: 12 ML | Refills: 0 | Status: SHIPPED | OUTPATIENT
Start: 2023-06-26

## 2023-06-26 RX ORDER — EMPAGLIFLOZIN 25 MG/1
1 TABLET, FILM COATED ORAL EVERY MORNING
COMMUNITY
Start: 2023-03-30 | End: 2023-06-26

## 2023-06-26 RX ORDER — METOPROLOL SUCCINATE 50 MG/1
75 TABLET, EXTENDED RELEASE ORAL DAILY
Qty: 90 TABLET | Refills: 1 | Status: SHIPPED | OUTPATIENT
Start: 2023-06-26

## 2023-06-26 NOTE — PATIENT INSTRUCTIONS
Thank you for choosing [facility]  To Do:  FOR [place]      Have blood tests done today  Start [facility] once a week   Follow up in Sept Ok [facility] [address] [facility]  Follow up with cardiology

## 2023-07-09 DIAGNOSIS — I48.91 ATRIAL FIBRILLATION WITH RVR (HCC): ICD-10-CM

## 2023-07-11 RX ORDER — AMIODARONE HYDROCHLORIDE 200 MG/1
200 TABLET ORAL DAILY
Qty: 30 TABLET | Refills: 0 | Status: SHIPPED | OUTPATIENT
Start: 2023-07-11

## 2023-08-22 DIAGNOSIS — I48.91 ATRIAL FIBRILLATION WITH RVR (HCC): ICD-10-CM

## 2023-09-03 RX ORDER — AMIODARONE HYDROCHLORIDE 200 MG/1
200 TABLET ORAL DAILY
Qty: 30 TABLET | Refills: 0 | OUTPATIENT
Start: 2023-09-03

## 2024-03-06 NOTE — TELEPHONE ENCOUNTER
----- Message from Oanh Breen MD sent at 5/14/2021  1:00 PM CDT -----  K+ slightly elevated but stable  Pt needs to follow up with nephrology Dr. Hiro Stauffer, pls make sure he is following up. monthly or less

## (undated) DEVICE — HEMOCLIP MED 24 CLIP/CARTRIDGE

## (undated) DEVICE — CELL SAVER RESERVOIR BRAT

## (undated) DEVICE — SUTURE PROLENE 8-0 BV-130-5

## (undated) DEVICE — SUTURE PROLENE 3-0 SH

## (undated) DEVICE — TAPE UMBILICAL U11T

## (undated) DEVICE — HEMOCLIP HORIZON LG 004200

## (undated) DEVICE — CELL SAVER 5/5+ BOWL KIT-225ML: Brand: HAEMONETICS CELL SAVER 5/5+ SYSTEMS

## (undated) DEVICE — SUTURE SILK 2-0

## (undated) DEVICE — STERILE POLYISOPRENE POWDER-FREE SURGICAL GLOVES: Brand: PROTEXIS

## (undated) DEVICE — SOL  .9 1000ML BTL

## (undated) DEVICE — HAND PIECE LEFT ATRICURE

## (undated) DEVICE — SUTURE WIRE DOUBLE STERNOTOMY

## (undated) DEVICE — SUTURE POLYDEK 2-0

## (undated) DEVICE — SYRINGE 30ML LL TIP

## (undated) DEVICE — GOWN,SIRUS,FAB REINF,RAGLAN,XL,STERILE: Brand: MEDLINE

## (undated) DEVICE — Device

## (undated) DEVICE — SOL LACT RINGERS 1000ML

## (undated) DEVICE — PDS II VLT 0 107CM AG ST3: Brand: ENDOLOOP

## (undated) DEVICE — CELL SAVER BAG 600ML 4R2023

## (undated) DEVICE — BLOWER CO2 MEDTRONIC/DLP 22150

## (undated) DEVICE — OPEN HEART: Brand: MEDLINE INDUSTRIES, INC.

## (undated) DEVICE — SUTURE PROLENE 6-0 C-1

## (undated) DEVICE — SUTURE PROLENE 4-0 SH

## (undated) DEVICE — TRAY ENDOVEIN KTV16 HARVESTING

## (undated) NOTE — ED AVS SNAPSHOT
[facility] in [address]    [address] [facility]    Phone:  [phone]    Fax:  Via Jovany Henry   MRN: [id number]    Department:  THE [facility] OF [facility] in [address]   Date of Visit:  1/23/2017 If you have any problems with your follow-up, please call our  at [phone]. Si usted tiene algun problema con thornton sequimiento, por favor llame a nuestro adminstrador de casos al [phone].     Expect to receive an electronic reques Cheyenne Rios [address] [facility] ([address]) [address] [facility] [address] [place] [address] ([address]) [address]  ([address]) [address] [facility] Dictated by: Lisa Myers MD on 1/23/2017 at 13:57       Approved by: Lisa Myers MD              Narrative:    PROCEDURE:  XR RIBS WITH CHEST (3 VIEWS), RIGHT  (CPT=71101)     TECHNIQUE:  PA Chest and three views of the ribs were obtained     COMPARISON:  None

## (undated) NOTE — LETTER
Patient Name: Twan Johnson  : 3/26/1955  MRN: [id number]  Patient Address: [address] [place] [id number]      Coronavirus Disease 2019 (COVID-19)     [facility] is committed to the safety and well-being of our sary symptoms carefully. If your symptoms get worse, call your healthcare provider immediately. 3. Get rest and stay hydrated. 4. If you have a medical appointment, call the healthcare provider ahead of time and tell them that you have or may have COVID-19. without the use of fever-reducing medications; and  · Improvement in respiratory symptoms (e.g., cough, shortness of breath); and  · At least 10 days have passed since symptoms first appeared OR if asymptomatic patient or date of symptom onset is unclear t convalescent plasma donors must:    · Have had a confirmed diagnosis of COVID-19  · Be symptom-free for at least 14 days*    *Some people will be required to have a repeat COVID-19 test in order to be eligible to donate.  If you’re instructed by Jem aguayo Post-COVID conditions to be random. Researchers are trying to identify similarities between people with a Post-COVID condition to better understand if there are risk factors. How do I prevent a Post-COVID condition?   The best way to prevent the long-t

## (undated) NOTE — LETTER
Kathryn Ramirez 182  [address] [facility] [address], [address] [facility]  Authorization for Surgical Operation and Procedure     Date:___________                                                                                                         Time:__________ 4.   Should the need arise during my operation or immediate post-operative period, I also consent to the administration of blood and/or blood products.   Further, I understand that despite careful testing and screening of blood or blood products by arnav 8.   I recognize that in the event my procedure results in extended X-Ray/fluoroscopy time, I may develop a skin reaction. 9.  If I have a Do Not Attempt Resuscitation (DNAR) order in place, that status will be suspended while in the operating room, proc 1. IAgustina agree to be cared for by an anesthesiologist, who is specially trained to monitor me and give me medicine to put me to sleep or keep me comfortable during my procedure    I understand that my anesthesiologist is not an employee or 5. My doctor has explained to me other choices available to me for my care (alternatives).   6. Pregnant Patients (“epidural”):  I understand that the risks of having an epidural (medicine given into my back to help control pain during labor), include itchi

## (undated) NOTE — LETTER
Date: 4/5/2023    Patient Name: Elio Evans,     Dr. Valeria Murillo has reviewed your lab results and states your creatinine is elevated. The doctor is advising for you to [facility]. Next, Dr. Valeria Murillo would like for you to follow up in the office as soon as you return from the [facility]. Lastly, the doctor would like for you to repeat BMP in the Suzy with physician if you are able too. If you have any questions please contact the office. Thank you and have a good day.

## (undated) NOTE — LETTER
[facility] [address], [address] [place] [address]    Consent for Anesthesia   1.    Ramona Alegria agree to be cared for by a physician anesthesiologist alone and/or with a nurse anesthetist, who is specially trained to monitor me and give · Rare risks include: remembering what happened during my procedure, allergic reactions to medications, injury to my airway, heart, lungs, vision, nerves, or muscles and in extremely rare instances death.   5. My doctor has explained to me other choices jordyn Patient Name: Madyson Diego     : 3/26/1955                 Printed: 2021 at [address] [place] [address] Record #: [id number]                                            Page 1 of 1

## (undated) NOTE — LETTER
[address] [facility] FOR BLOOD OR BLOOD COMPONENTS      In the course of your treatment, it may become necessary to administer a transfusion of blood or blood components.  This form provides basic information concerning this proc If loss of blood poses serious threats in the course of your treatment, THERE IS NO EFFECTIVE ALTERNATIVE TO BLOOD TRANSFUSION.  However, if you have any further questions on this matter, your physician will fully explain the alternatives to you if it has n

## (undated) NOTE — LETTER
Kathryn Ramirez 182  [address] [facility] [address], [address] [facility]  Authorization for Surgical Operation and Procedure     Date:___________                                                                                                         Time:__________ 4.   Should the need arise during my operation or immediate post-operative period, I also consent to the administration of blood and/or blood products.   Further, I understand that despite careful testing and screening of blood or blood products by arnav 8.   I recognize that in the event my procedure results in extended X-Ray/fluoroscopy time, I may develop a skin reaction. 9.  If I have a Do Not Attempt Resuscitation (DNAR) order in place, that status will be suspended while in the operating room, proc 1. IEmil agree to be cared for by an anesthesiologist, who is specially trained to monitor me and give me medicine to put me to sleep or keep me comfortable during my procedure    I understand that my anesthesiologist is not an employee or 5. My doctor has explained to me other choices available to me for my care (alternatives).   6. Pregnant Patients (“epidural”):  I understand that the risks of having an epidural (medicine given into my back to help control pain during labor), include itchi

## (undated) NOTE — LETTER
[facility] [address], [address]  Consent for Procedure/Sedation    Date: 2/7/2021    Time: __________      1.  I authorize the performance upon Vinny Hassan the following:cardiac catheterization, left ventricular cineangiog Signature of Patient: ____________________________________________________    Signature of person authorized                                     Relationship to  to consent for patient: _________________________ patient: ___________________    Witness: ___

## (undated) NOTE — LETTER
Your patient was recently seen at the [facility] for a hospital follow-up visit. The visit note is attached. Please contact the clinic with any questions at [phone].     Thank you,  CARRILLO Bernal

## (undated) NOTE — LETTER
Stevie Painter M.D., F.A.C.S. Stephanie Smith M.D., F.A.C.S. Lisa Paulson M.D., Inder Lewis. ADINA العلي M.D., F.A.C.S. Anshu Paul. Tigist Sparks M.D., F.A.C.S. Tito Roach M.D. JOEY Anna M.D., F. To that end, on the following page we will ask you some questions to make certain that you understand everything which has been explained to you.  Included in this understanding is that there are both surgical and nonsurgical treatments available for you, t A [facility] ([facility]) surgeon has met with me and explained the matter of my illness, and what treatments might be available to improve my condition.  As a result of that conversation, I understand the following:    A [facility] surgeon met with The nature and options for treatment for my condition have been explained to me, in detail, by a [facility] surgeon and all questions have been answered to my satisfaction.  I understand that I am not required to undergo surgery, and further, that if I so desire,

## (undated) NOTE — LETTER
Najma Pabon M.D., F.A.C.S. Jt Arias M.D., F.A.C.S. Gurinder Roy M.D., Jon Morales. ADINA Haile M.D., F.A.C.S. Iwona Ying. Bozena Wall M.D., F.A.C.S. Kinjal Castillo M.D. JOEY Ferreira M.D., F. To that end, on the following page we will ask you some questions to make certain that you understand everything which has been explained to you.  Included in this understanding is that there are both surgical and nonsurgical treatments available for you, t A [facility] ([facility] surgeon has met with me and explained the matter of my illness, and what treatments might be available to improve my condition.  As a result of that conversation, I understand the following:    A [facility] surgeon met with The nature and options for treatment for my condition have been explained to me, in detail, by a [facility] surgeon and all questions have been answered to my satisfaction.  I understand that I am not required to undergo surgery, and further, that if I so desire,

## (undated) NOTE — LETTER
02/10/21      To Whom It May Concern:    Danita's  was admitted to [facility] on 2/4/21 and is expected to remain in the hospital until 2/15/21 for treatment of a medical condition.      Please excuse Ruchi Singh from work for these days and u